# Patient Record
Sex: MALE | Race: WHITE | Employment: OTHER | URBAN - METROPOLITAN AREA
[De-identification: names, ages, dates, MRNs, and addresses within clinical notes are randomized per-mention and may not be internally consistent; named-entity substitution may affect disease eponyms.]

---

## 2019-03-14 RX ORDER — CARVEDILOL 12.5 MG/1
12.5 TABLET ORAL 2 TIMES DAILY WITH MEALS
COMMUNITY
End: 2022-01-10

## 2019-03-14 RX ORDER — ATORVASTATIN CALCIUM 40 MG/1
40 TABLET, FILM COATED ORAL DAILY
COMMUNITY
End: 2022-03-21 | Stop reason: ALTCHOICE

## 2019-03-14 RX ORDER — NEBIVOLOL 5 MG/1
TABLET ORAL DAILY
COMMUNITY
End: 2022-01-10

## 2019-03-14 RX ORDER — BENAZEPRIL HYDROCHLORIDE 40 MG/1
40 TABLET ORAL DAILY
COMMUNITY
End: 2022-02-07

## 2019-03-14 RX ORDER — HYDROCHLOROTHIAZIDE 12.5 MG/1
12.5 TABLET ORAL DAILY
COMMUNITY
End: 2022-01-10

## 2019-03-14 RX ORDER — ASPIRIN 325 MG
325 TABLET ORAL DAILY
COMMUNITY
End: 2022-01-10

## 2019-03-16 ENCOUNTER — ANESTHESIA EVENT (OUTPATIENT)
Dept: ENDOSCOPY | Age: 74
End: 2019-03-16
Payer: MEDICARE

## 2019-03-18 ENCOUNTER — HOSPITAL ENCOUNTER (OUTPATIENT)
Age: 74
Setting detail: OUTPATIENT SURGERY
Discharge: HOME | End: 2019-03-18
Attending: INTERNAL MEDICINE | Admitting: INTERNAL MEDICINE
Payer: MEDICARE

## 2019-03-18 ENCOUNTER — ANESTHESIA (OUTPATIENT)
Dept: ENDOSCOPY | Age: 74
End: 2019-03-18
Payer: MEDICARE

## 2019-03-18 VITALS
TEMPERATURE: 98.8 F | HEART RATE: 72 BPM | OXYGEN SATURATION: 99 % | SYSTOLIC BLOOD PRESSURE: 153 MMHG | DIASTOLIC BLOOD PRESSURE: 76 MMHG | WEIGHT: 210 LBS | HEIGHT: 65 IN | BODY MASS INDEX: 34.99 KG/M2 | RESPIRATION RATE: 23 BRPM

## 2019-03-18 PROCEDURE — 76060000032 HC ANESTHESIA 0.5 TO 1 HR: Performed by: INTERNAL MEDICINE

## 2019-03-18 PROCEDURE — 76040000007: Performed by: INTERNAL MEDICINE

## 2019-03-18 PROCEDURE — 74011250636 HC RX REV CODE- 250/636

## 2019-03-18 PROCEDURE — 74011250636 HC RX REV CODE- 250/636: Performed by: NURSE ANESTHETIST, CERTIFIED REGISTERED

## 2019-03-18 RX ORDER — SODIUM CHLORIDE, SODIUM LACTATE, POTASSIUM CHLORIDE, CALCIUM CHLORIDE 600; 310; 30; 20 MG/100ML; MG/100ML; MG/100ML; MG/100ML
25 INJECTION, SOLUTION INTRAVENOUS CONTINUOUS
Status: DISCONTINUED | OUTPATIENT
Start: 2019-03-18 | End: 2019-03-18 | Stop reason: HOSPADM

## 2019-03-18 RX ORDER — PROPOFOL 10 MG/ML
INJECTION, EMULSION INTRAVENOUS AS NEEDED
Status: DISCONTINUED | OUTPATIENT
Start: 2019-03-18 | End: 2019-03-18 | Stop reason: HOSPADM

## 2019-03-18 RX ORDER — LIDOCAINE HYDROCHLORIDE 20 MG/ML
INJECTION, SOLUTION EPIDURAL; INFILTRATION; INTRACAUDAL; PERINEURAL AS NEEDED
Status: DISCONTINUED | OUTPATIENT
Start: 2019-03-18 | End: 2019-03-18 | Stop reason: HOSPADM

## 2019-03-18 RX ADMIN — PROPOFOL 20 MG: 10 INJECTION, EMULSION INTRAVENOUS at 10:04

## 2019-03-18 RX ADMIN — LIDOCAINE HYDROCHLORIDE 60 MG: 20 INJECTION, SOLUTION EPIDURAL; INFILTRATION; INTRACAUDAL; PERINEURAL at 09:40

## 2019-03-18 RX ADMIN — PROPOFOL 20 MG: 10 INJECTION, EMULSION INTRAVENOUS at 10:02

## 2019-03-18 RX ADMIN — PROPOFOL 70 MG: 10 INJECTION, EMULSION INTRAVENOUS at 09:58

## 2019-03-18 RX ADMIN — PROPOFOL 20 MG: 10 INJECTION, EMULSION INTRAVENOUS at 10:06

## 2019-03-18 RX ADMIN — PROPOFOL 20 MG: 10 INJECTION, EMULSION INTRAVENOUS at 09:59

## 2019-03-18 RX ADMIN — PROPOFOL 20 MG: 10 INJECTION, EMULSION INTRAVENOUS at 10:05

## 2019-03-18 RX ADMIN — FAMOTIDINE 20 MG: 10 INJECTION, SOLUTION INTRAVENOUS at 09:35

## 2019-03-18 RX ADMIN — PROPOFOL 20 MG: 10 INJECTION, EMULSION INTRAVENOUS at 10:01

## 2019-03-18 RX ADMIN — PROPOFOL 20 MG: 10 INJECTION, EMULSION INTRAVENOUS at 10:00

## 2019-03-18 RX ADMIN — SODIUM CHLORIDE, SODIUM LACTATE, POTASSIUM CHLORIDE, AND CALCIUM CHLORIDE 25 ML/HR: 600; 310; 30; 20 INJECTION, SOLUTION INTRAVENOUS at 09:30

## 2019-03-18 NOTE — ANESTHESIA PREPROCEDURE EVALUATION
Anesthetic History   No history of anesthetic complications            Review of Systems / Medical History  Patient summary reviewed, nursing notes reviewed and pertinent labs reviewed    Pulmonary  Within defined limits                 Neuro/Psych   Within defined limits           Cardiovascular    Hypertension: well controlled                   GI/Hepatic/Renal                Endo/Other      Hypothyroidism: well controlled       Other Findings              Physical Exam    Airway  Mallampati: II  TM Distance: 4 - 6 cm  Neck ROM: normal range of motion   Mouth opening: Normal     Cardiovascular    Rhythm: regular  Rate: normal         Dental  No notable dental hx       Pulmonary  Breath sounds clear to auscultation               Abdominal  Abdominal exam normal       Other Findings            Anesthetic Plan    ASA: 2  Anesthesia type: MAC            Anesthetic plan and risks discussed with: Patient

## 2019-03-18 NOTE — H&P
[email or web address]  [phone]    Gastroenterology pre op History and Physical     Impression:   1. High risk colon cancer screening exam      Plan:     1. Colonoscopy      Chief Complaint: high risk colon cancer screening exam.      HPI:  José Manuel Nñuez is a 75 y.o. male who is being seen on consult for high risk colon cancer screening with colonoscopy. There is a previous history of colon polyps, and the patient returns for a surveillence exam    PMH:   Past Medical History:   Diagnosis Date    Hypertension     Thyroid disease        PSH:   Past Surgical History:   Procedure Laterality Date    HX GI      hernia repair and colonoscopy       Social HX:   Social History     Socioeconomic History    Marital status:      Spouse name: Not on file    Number of children: Not on file    Years of education: Not on file    Highest education level: Not on file   Social Needs    Financial resource strain: Not on file    Food insecurity - worry: Not on file    Food insecurity - inability: Not on file   [employer] needs - medical: Not on file   [employer] needs - non-medical: Not on file   Occupational History    Not on file   Tobacco Use    Smoking status: Former Smoker    Smokeless tobacco: Never Used   Substance and Sexual Activity    Alcohol use: Yes     Alcohol/week: 1.8 oz     Types: 3 Shots of liquor per week     Comment: socially    Drug use: No    Sexual activity: Not on file   Other Topics Concern    Not on file   Social History Narrative    Not on file       FHX:   History reviewed. No pertinent family history. Allergy:   Allergies   Allergen Reactions    Caffeine Anaphylaxis       Home Medications:     Medications Prior to Admission   Medication Sig    nebivolol (BYSTOLIC) 5 mg tablet Take  by mouth daily.  atorvastatin (LIPITOR) 20 mg tablet Take  by mouth daily.  carvedilol (COREG) 12.5 mg tablet Take 12.5 mg by mouth two (2) times daily (with meals).     benazepril (LOTENSIN) 40 mg tablet Take 40 mg by mouth daily.  hydroCHLOROthiazide (HYDRODIURIL) 12.5 mg tablet Take 12.5 mg by mouth daily.  aspirin (ASPIRIN) 325 mg tablet Take 325 mg by mouth daily.  Cetirizine (ZYRTEC) 10 mg cap Take 10 mg by mouth.  OTHER     fenofibrate nanocrystallized (TRICOR) 145 mg tablet Take  by mouth daily.  simvastatin (ZOCOR) 10 mg tablet Take  by mouth nightly.  amLODIPine-benazepril (LOTREL) 5-10 mg per capsule Take 1 capsule by mouth daily.  omeprazole (PRILOSEC) 10 mg capsule Take  by mouth daily.  levothyroxine (SYNTHROID) 100 mcg tablet Take  by mouth Daily (before breakfast).  sucralfate (CARAFATE) 1 gram tablet Take 1 tablet by mouth four (4) times daily. Review of Systems:     Constitutional: No fevers, chills, weight loss, fatigue. Skin: No rashes, pruritis, jaundice, ulcerations, erythema. HENT: No headaches, nosebleeds, sinus pressure, rhinorrhea, sore throat. Eyes: No visual changes, blurred vision, eye pain, photophobia, jaundice. Cardiovascular: No chest pain, heart palpitations. Respiratory: No cough, SOB, wheezing, chest discomfort, orthopnea. Gastrointestinal:    Genitourinary: No dysuria, bleeding, discharge, pyuria. Musculoskeletal: No weakness, arthralgias, wasting. Endo: No sweats. Heme: No bruising, easy bleeding. Allergies: As noted. Neurological: Cranial nerves intact. Alert and oriented. Gait not assessed. Psychiatric:  No anxiety, depression, hallucinations. Visit Vitals  Ht 5' 5\" (1.651 m)   Wt 97.5 kg (215 lb)   BMI 35.78 kg/m²       Physical Assessment:     constitutional: appearance: well developed, well nourished, normal habitus, no deformities, in no acute distress. skin: inspection: no rashes, ulcers, icterus or other lesions; no clubbing or telangiectasias. palpation: no induration or subcutaneos nodules.    eyes: inspection: normal conjunctivae and lids; no jaundice pupils: symmetrical, normoreactive to light, normal accommodation and size. ENMT: mouth: normal oral mucosa,lips and gums; good dentition. oropharynx: normal tongue, hard and soft palate; posterior pharynx without erithema, exudate or lesions. neck: thyroid: normal size, consistency and position; no masses or tenderness. respiratory: effort: normal chest excursion; no intercostal retraction or accessory muscle use. cardiovascular: abdominal aorta: normal size and position; no bruits. palpation: PMI of normal size and position; normal rhythm; no thrill or murmurs. abdominal: abdomen: normal consistency; no tenderness or masses. hernias: no hernias appreciated. liver: normal size and consistency. spleen: not palpable. rectal: hemoccult/guaiac: not performed. musculoskeletal: digits and nails: no clubbing, cyanosis, petechiae or other inflammatory conditions. gait: normal gait and station head and neck: normal range of motion; no pain, crepitation or contracture. spine/ribs/pelvis: normal range of motion; no pain, deformity or contracture. lymphatic: axilae: not palpable. groin: not palpable. neck: within normal limits. other: not palpable. neurologic: cranial nerves: II-XII normal.   psychiatric: judgement/insight: within normal limits. memory: within normal limits for recent and remote events. mood and affect: no evidence of depression, anxiety or agitation. orientation: oriented to time, space and person. Basic Metabolic Profile   No results for input(s): NA, K, CL, CO2, BUN, GLU, CA, MG, PHOS in the last 72 hours. No lab exists for component: CREAT      CBC w/Diff    No results for input(s): WBC, RBC, HGB, HCT, MCV, MCH, MCHC, RDW, PLT, HGBEXT, HCTEXT, PLTEXT in the last 72 hours. No lab exists for component: MPV No results for input(s): GRANS, LYMPH, EOS, PRO, MYELO, METAS, BLAST in the last 72 hours.     No lab exists for component: MONO, BASO     Hepatic Function   No results for input(s): ALB, TP, TBILI, GPT, SGOT, AP, AML, LPSE in the last 72 hours. No lab exists for component: MICHAEL Pastrana MD  [facility], [address] [place] [address]  [email or web address]

## 2019-03-18 NOTE — ANESTHESIA POSTPROCEDURE EVALUATION
Procedure(s):  COLONOSCOPY.     Anesthesia Post Evaluation      Multimodal analgesia: multimodal analgesia used between 6 hours prior to anesthesia start to PACU discharge  Patient location during evaluation: bedside  Patient participation: complete - patient participated  Level of consciousness: awake  Pain management: adequate  Airway patency: patent  Anesthetic complications: no  Cardiovascular status: stable  Respiratory status: acceptable  Hydration status: acceptable  Post anesthesia nausea and vomiting:  controlled      Visit Vitals  /50   Pulse 71   Temp 37.1 °C (98.8 °F)   Resp 16   Ht 5' 5\" (1.651 m)   Wt 95.3 kg (210 lb)   SpO2 100%   BMI 34.95 kg/m²

## 2019-03-18 NOTE — DISCHARGE INSTRUCTIONS
Colonoscopy: What to Expect at [address] [place] [address]  After you have a colonoscopy, you will stay at the clinic for 1 to 2 hours until the medicines wear off. Then you can go home. But you will need to arrange for a ride. Your doctor will tell you when you can eat and do your other usual activities. Your doctor will talk to you about when you will need your next colonoscopy. Your doctor can help you decide how often you need to be checked. This will depend on the results of your test and your risk for colorectal cancer. After the test, you may be bloated or have gas pains. You may need to pass gas. If a biopsy was done or a polyp was removed, you may have streaks of blood in your stool (feces) for a few days. This care sheet gives you a general idea about how long it will take for you to recover. But each person recovers at a different pace. Follow the steps below to get better as quickly as possible. How can you care for yourself at home? Activity  · Rest when you feel tired. · You can do your normal activities when it feels okay to do so. Diet  · Follow your doctor's directions for eating. · Unless your doctor has told you not to, drink plenty of fluids. This helps to replace the fluids that were lost during the colon prep. · Do not drink alcohol. Medicines  · If polyps were removed or a biopsy was done during the test, your doctor may tell you not to take aspirin or other anti-inflammatory medicines for a few days. These include ibuprofen (Advil, Motrin) and naproxen (Aleve). Other instructions  · For your safety, do not drive or operate machinery until the medicine wears off and you can think clearly. Your doctor may tell you not to drive or operate machinery until the day after your test.  · Do not sign legal documents or make major decisions until the medicine wears off and you can think clearly. The anesthesia can make it hard for you to fully understand what you are agreeing to.   Follow-up care is a key part of your treatment and safety. Be sure to make and go to all appointments, and call your doctor if you are having problems. It's also a good idea to know your test results and keep a list of the medicines you take. When should you call for help? Call 911 anytime you think you may need emergency care. For example, call if:  · You passed out (lost consciousness). · You pass maroon or bloody stools. · You have severe belly pain. Call your doctor now or seek immediate medical care if:  · Your stools are black and tarlike. · Your stools have streaks of blood, but you did not have a biopsy or any polyps removed. · You have belly pain, or your belly is swollen and firm. · You vomit. · You have a fever. · You are very dizzy. Watch closely for changes in your health, and be sure to contact your doctor if you have any problems. Where can you learn more? Go to [employer]  Enter E264 in the search box to learn more about \"Colonoscopy: What to Expect at Home. \"   © 6601-1852 Healthwise, Incorporated. Care instructions adapted under license by [facility] (which disclaims liability or warranty for this information). This care instruction is for use with your licensed healthcare professional. If you have questions about a medical condition or this instruction, always ask your healthcare professional. [place] [address] any warranty or liability for your use of this information. Content Version: 14.2.544937; Current as of: November 14, 2014      DISCHARGE SUMMARY from Nurse     POST-PROCEDURE INSTRUCTIONS:    Call your Physician if you:  ? Observe any excess bleeding. ? Develop a temperature over 100.5o F.  ? Experience abdominal, shoulder or chest pain. ? Notice any signs of decreased circulation or nerve impairment to an extremity such as a change in color, persistent numbness, tingling, coldness or increase in pain. ?  Vomit blood or you have nausea and vomiting lasting longer than 4 hours. ? Are unable to take medications. ? Are unable to urinate within 8 hours after discharge following general anesthesia or intravenous sedation. For the next 24 hours after receiving general anesthesia or intravenous sedation, or while taking prescription Narcotics, limit your activities:  ? Do NOT drive a motor vehicle, operate hazard machinery or power tools, or perform tasks that require coordination. The medication you received during your procedure may have some effect on your mental awareness. ? Do NOT make important personal or business decisions. The medication you received during your procedure may have some effect on your mental awareness. ? Do NOT drink alcoholic beverages. These drinks do not mix well with the medications that have been given to you. ? Upon discharge from the hospital, you must be accompanied by a responsible adult. ? Resume your diet as directed by your physician. ? Resume medications as your physician has prescribed. ? Please give a list of your current medications to your Primary Care Provider. ? Please update this list whenever your medications are discontinued, doses are changed, or new medications (including over-the-counter products) are added. ? Please carry medication information at all times in case of emergency situations. These are general instructions for a healthy lifestyle:    No smoking/ No tobacco products/ Avoid exposure to second hand smoke.  Surgeon General's Warning:  Quitting smoking now greatly reduces serious risk to your health. Obesity, smoking, and a sedentary lifestyle greatly increase your risk for illness.    A healthy diet, regular physical exercise & weight monitoring are important for maintaining a healthy lifestyle   You may be retaining fluid if you have a history of heart failure or if you experience any of the following symptoms:  Weight gain of 3 pounds or more overnight or 5 pounds in a week, increased swelling in our hands or feet or shortness of breath while lying flat in bed. Please call your doctor as soon as you notice any of these symptoms; do not wait until your next office visit. Recognize signs and symptoms of STROKE:  F  -  Face looks uneven  A  -  Arms unable to move or move unevenly  S  -  Speech slurred or non-existent  T  -  Time to call 911 - as soon as signs and symptoms begin - DO NOT go back to bed or wait to see If you get better - TIME IS BRAIN. Colorectal Screening   Colorectal cancer almost always develops from precancerous polyps (abnormal growths) in the colon or rectum. Screening tests can find precancerous polyps, so that they can be removed before they turn into cancer. Screening tests can also find colorectal cancer early, when treatment works best.  Wayne Speak with your physician about when you should begin screening and how often you should be tested. Additional Information    If you have questions, please call [phone]. Remember, [employer] is NOT to be used for urgent needs. For medical emergencies, dial 911. Educational references and/or instructions provided during this visit included:    Diverticulosis    Discharge information has been reviewed with the patient. The patientPatient Education        Diverticulosis: Care Instructions  Your Care Instructions  In diverticulosis, pouches called diverticula form in the wall of the large intestine (colon). The pouches do not cause any pain or other symptoms. Most people who have diverticulosis do not know they have it. But the pouches sometimes bleed, and if they become infected, they can cause pain and other symptoms. When this happens, it is called diverticulitis. Diverticula form when pressure pushes the wall of the colon outward at certain weak points. A diet that is too low in fiber can cause diverticula. Follow-up care is a key part of your treatment and safety.  Be sure to make and go to all appointments, and call your doctor if you are having problems. It's also a good idea to know your test results and keep a list of the medicines you take. How can you care for yourself at home? · Include fruits, leafy green vegetables, beans, and whole grains in your diet each day. These foods are high in fiber. · Take a fiber supplement, such as Citrucel or Metamucil, every day if needed. Read and follow all instructions on the label. · Drink plenty of fluids, enough so that your urine is light yellow or clear like water. If you have kidney, heart, or liver disease and have to limit fluids, talk with your doctor before you increase the amount of fluids you drink. · Get at least 30 minutes of exercise on most days of the week. Walking is a good choice. You also may want to do other activities, such as running, swimming, cycling, or playing tennis or team sports. · Cut out foods that cause gas, pain, or other symptoms. When should you call for help? Call your doctor now or seek immediate medical care if:    · You have belly pain.     · You pass maroon or very bloody stools.     · You have a fever.     · You have nausea and vomiting.     · You have unusual changes in your bowel movements or abdominal swelling.     · You have burning pain when you urinate.     · You have abnormal vaginal discharge.     · You have shoulder pain.     · You have cramping pain that does not get better when you have a bowel movement or pass gas.     · You pass gas or stool from your urethra while urinating.    Watch closely for changes in your health, and be sure to contact your doctor if you have any problems. Where can you learn more? Go to http://yasir-quirino.info/. Enter U798 in the search box to learn more about \"Diverticulosis: Care Instructions. \"  Current as of: March 27, 2018  Content Version: 11.9  © 1603-7436 [employer].  Care instructions adapted under license by [employer] (which disclaims liability or warranty for this information). If you have questions about a medical condition or this instruction, always ask your healthcare professional. [place] [address] any warranty or liability for your use of this information. verbalized understanding.

## 2020-02-17 ENCOUNTER — HOSPITAL ENCOUNTER (OUTPATIENT)
Dept: BONE DENSITY | Age: 75
Discharge: HOME | End: 2020-02-17
Attending: INTERNAL MEDICINE
Payer: MEDICARE

## 2020-02-17 DIAGNOSIS — M81.0 OSTEOPOROSIS: ICD-10-CM

## 2020-02-17 PROCEDURE — 77080 DXA BONE DENSITY AXIAL: CPT

## 2020-10-27 ENCOUNTER — HOSPITAL ENCOUNTER (OUTPATIENT)
Dept: GENERAL RADIOLOGY | Age: 75
Discharge: HOME | End: 2020-10-27
Payer: MEDICARE

## 2020-10-27 DIAGNOSIS — M15.0 PRIMARY GENERALIZED (OSTEO)ARTHRITIS: ICD-10-CM

## 2020-10-27 PROCEDURE — 72110 X-RAY EXAM L-2 SPINE 4/>VWS: CPT

## 2021-01-18 ENCOUNTER — TRANSCRIBE ORDER (OUTPATIENT)
Dept: SCHEDULING | Age: 76
End: 2021-01-18

## 2021-01-18 DIAGNOSIS — M48.061 STENOSIS, SPINAL, LUMBAR: Primary | ICD-10-CM

## 2021-01-28 ENCOUNTER — HOSPITAL ENCOUNTER (OUTPATIENT)
Dept: MRI IMAGING | Age: 76
Discharge: HOME | End: 2021-01-28
Attending: INTERNAL MEDICINE
Payer: MEDICARE

## 2021-01-28 DIAGNOSIS — M48.061 STENOSIS, SPINAL, LUMBAR: ICD-10-CM

## 2021-01-28 PROCEDURE — 72148 MRI LUMBAR SPINE W/O DYE: CPT

## 2022-01-03 ENCOUNTER — TRANSCRIBE ORDER (OUTPATIENT)
Dept: SCHEDULING | Age: 77
End: 2022-01-03

## 2022-01-03 DIAGNOSIS — I25.9 CHRONIC ISCHEMIC HEART DISEASE: Primary | ICD-10-CM

## 2022-01-03 DIAGNOSIS — I25.9 ISCHEMIC HEART DISEASE: ICD-10-CM

## 2022-01-06 ENCOUNTER — HOSPITAL ENCOUNTER (OUTPATIENT)
Dept: NUCLEAR MEDICINE | Age: 77
Discharge: HOME | End: 2022-01-06
Attending: INTERNAL MEDICINE
Payer: MEDICARE

## 2022-01-06 ENCOUNTER — HOSPITAL ENCOUNTER (OUTPATIENT)
Dept: NON INVASIVE DIAGNOSTICS | Age: 77
Discharge: HOME | End: 2022-01-06
Attending: INTERNAL MEDICINE
Payer: MEDICARE

## 2022-01-06 VITALS
HEIGHT: 65 IN | DIASTOLIC BLOOD PRESSURE: 100 MMHG | WEIGHT: 212 LBS | SYSTOLIC BLOOD PRESSURE: 179 MMHG | BODY MASS INDEX: 35.32 KG/M2

## 2022-01-06 DIAGNOSIS — I25.9 ISCHEMIC HEART DISEASE: ICD-10-CM

## 2022-01-06 LAB
NUC STRESS EJECTION FRACTION: 40 %
STRESS BASELINE DIAS BP: 100 MMHG
STRESS BASELINE HR: 83 BPM
STRESS BASELINE SYS BP: 179 MMHG
STRESS ESTIMATED WORKLOAD: 1 METS
STRESS EXERCISE DUR MIN: NORMAL
STRESS PEAK DIAS BP: 100 MMHG
STRESS PEAK SYS BP: 179 MMHG
STRESS PERCENT HR ACHIEVED: 78 %
STRESS POST PEAK HR: 111 BPM
STRESS RATE PRESSURE PRODUCT: NORMAL BPM*MMHG
STRESS TARGET HR: 143 BPM
TID: 1.15

## 2022-01-06 PROCEDURE — 93017 CV STRESS TEST TRACING ONLY: CPT

## 2022-01-06 PROCEDURE — 74011250637 HC RX REV CODE- 250/637: Performed by: INTERNAL MEDICINE

## 2022-01-06 PROCEDURE — A9500 TC99M SESTAMIBI: HCPCS

## 2022-01-06 PROCEDURE — A9500 TC99M SESTAMIBI: HCPCS | Performed by: INTERNAL MEDICINE

## 2022-01-06 PROCEDURE — 74011250636 HC RX REV CODE- 250/636: Performed by: INTERNAL MEDICINE

## 2022-01-06 RX ORDER — SODIUM CHLORIDE 9 MG/ML
250 INJECTION, SOLUTION INTRAVENOUS ONCE
Status: COMPLETED | OUTPATIENT
Start: 2022-01-06 | End: 2022-01-06

## 2022-01-06 RX ORDER — TETRAKIS(2-METHOXYISOBUTYLISOCYANIDE)COPPER(I) TETRAFLUOROBORATE 1 MG/ML
10.3 INJECTION, POWDER, LYOPHILIZED, FOR SOLUTION INTRAVENOUS
Status: COMPLETED | OUTPATIENT
Start: 2022-01-06 | End: 2022-01-06

## 2022-01-06 RX ADMIN — REGADENOSON 0.4 MG: 0.08 INJECTION, SOLUTION INTRAVENOUS at 11:10

## 2022-01-06 RX ADMIN — Medication 33 MILLICURIE: at 11:10

## 2022-01-06 RX ADMIN — SODIUM CHLORIDE 250 ML: 900 INJECTION, SOLUTION INTRAVENOUS at 11:10

## 2022-01-06 RX ADMIN — TECHNETIUM TC-99M SESTAMIBI 10.3 MILLICURIE: 1 INJECTION INTRAVENOUS at 08:55

## 2022-01-10 ENCOUNTER — TELEPHONE (OUTPATIENT)
Dept: CARDIOLOGY CLINIC | Age: 77
End: 2022-01-10

## 2022-01-10 ENCOUNTER — OFFICE VISIT (OUTPATIENT)
Dept: CARDIOLOGY CLINIC | Age: 77
End: 2022-01-10
Payer: MEDICARE

## 2022-01-10 VITALS
HEART RATE: 90 BPM | OXYGEN SATURATION: 97 % | HEIGHT: 65 IN | SYSTOLIC BLOOD PRESSURE: 132 MMHG | BODY MASS INDEX: 35.65 KG/M2 | WEIGHT: 214 LBS | DIASTOLIC BLOOD PRESSURE: 88 MMHG

## 2022-01-10 DIAGNOSIS — R94.39 ABNORMAL STRESS TEST: Primary | ICD-10-CM

## 2022-01-10 DIAGNOSIS — R07.9 CHEST PAIN, UNSPECIFIED TYPE: ICD-10-CM

## 2022-01-10 DIAGNOSIS — I10 ESSENTIAL HYPERTENSION WITH GOAL BLOOD PRESSURE LESS THAN 140/90: ICD-10-CM

## 2022-01-10 DIAGNOSIS — E78.00 PURE HYPERCHOLESTEROLEMIA: ICD-10-CM

## 2022-01-10 PROCEDURE — 1101F PT FALLS ASSESS-DOCD LE1/YR: CPT | Performed by: INTERNAL MEDICINE

## 2022-01-10 PROCEDURE — G8752 SYS BP LESS 140: HCPCS | Performed by: INTERNAL MEDICINE

## 2022-01-10 PROCEDURE — 99205 OFFICE O/P NEW HI 60 MIN: CPT | Performed by: INTERNAL MEDICINE

## 2022-01-10 PROCEDURE — 93000 ELECTROCARDIOGRAM COMPLETE: CPT | Performed by: INTERNAL MEDICINE

## 2022-01-10 PROCEDURE — G8417 CALC BMI ABV UP PARAM F/U: HCPCS | Performed by: INTERNAL MEDICINE

## 2022-01-10 PROCEDURE — G8510 SCR DEP NEG, NO PLAN REQD: HCPCS | Performed by: INTERNAL MEDICINE

## 2022-01-10 PROCEDURE — G8536 NO DOC ELDER MAL SCRN: HCPCS | Performed by: INTERNAL MEDICINE

## 2022-01-10 PROCEDURE — G8428 CUR MEDS NOT DOCUMENT: HCPCS | Performed by: INTERNAL MEDICINE

## 2022-01-10 PROCEDURE — G8754 DIAS BP LESS 90: HCPCS | Performed by: INTERNAL MEDICINE

## 2022-01-10 RX ORDER — ASPIRIN 325 MG
325 TABLET ORAL DAILY
Status: CANCELLED | OUTPATIENT
Start: 2022-01-10

## 2022-01-10 RX ORDER — ASPIRIN 81 MG/1
81 TABLET ORAL DAILY
Qty: 30 TABLET | Refills: 5 | Status: SHIPPED | OUTPATIENT
Start: 2022-01-10

## 2022-01-10 RX ORDER — ISOSORBIDE MONONITRATE 30 MG/1
30 TABLET, EXTENDED RELEASE ORAL
Qty: 30 TABLET | Refills: 5 | Status: SHIPPED | OUTPATIENT
Start: 2022-01-10 | End: 2022-05-16

## 2022-01-10 RX ORDER — FUROSEMIDE 40 MG/1
40 TABLET ORAL DAILY
Qty: 30 TABLET | Refills: 5 | Status: SHIPPED | OUTPATIENT
Start: 2022-01-10 | End: 2022-02-07 | Stop reason: SDUPTHER

## 2022-01-10 RX ORDER — CARVEDILOL 3.12 MG/1
3.12 TABLET ORAL 2 TIMES DAILY WITH MEALS
Qty: 60 TABLET | Refills: 5 | Status: SHIPPED | OUTPATIENT
Start: 2022-01-10 | End: 2022-10-10 | Stop reason: SDUPTHER

## 2022-01-10 NOTE — LETTER
1/10/2022    Patient: Eliz Pimentel   YOB: 1945   Date of Visit: 1/10/2022     Laci Feliz MD  [address] [place] [address]  Via Fax: [phone]    Dear Laci Feliz MD,      Thank you for referring Mr. Leilani Escobar to [address] [place] [address] for evaluation. My notes for this consultation are attached. If you have questions, please do not hesitate to call me. I look forward to following your patient along with you.       Sincerely,    Penny Sands MD

## 2022-01-10 NOTE — PROGRESS NOTES
Brandi Alanis presents today for   Chief Complaint   Patient presents with    New Patient     referred by PCP for abnormal stress test    Chest Pain     before stress test     Shortness of Breath     with exertion       Brandi Alanis preferred language for health care discussion is english/other. Is someone accompanying this pt? wife    Is the patient using any DME equipment during [address]? no    Depression Screening:  3 most recent PHQ Screens 1/10/2022   Little interest or pleasure in doing things Not at all   Feeling down, depressed, irritable, or hopeless Not at all   Total Score PHQ 2 0       Learning Assessment:  No flowsheet data found. Abuse Screening:  Abuse Screening Questionnaire 1/10/2022   Do you ever feel afraid of your partner? N   Are you in a relationship with someone who physically or mentally threatens you? N   Is it safe for you to go home? Y       Fall Risk  Fall Risk Assessment, last 12 mths 1/10/2022   Able to walk? Yes   Fall in past 12 months? 0   Do you feel unsteady? 0   Are you worried about falling 0           Pt currently taking Anticoagulant therapy? no    Pt currently taking Antiplatelet therapy ? Aspirin 325 mg      Coordination of Care:  1. Have you been to the ER, urgent care clinic since your last visit? Hospitalized since your last visit? no    2. Have you seen or consulted any other health care providers outside of the [address] [place] [address] since your last visit? Include any pap smears or colon screening.  no

## 2022-01-10 NOTE — PATIENT INSTRUCTIONS
Patient needs to call back to clarify medications that he is taking    Patient should be taking  Aspirin 81mg once daily  Imdur 30mg once daily  Coreg 3.125mg twice a day  Atorvastatin 20mg once daily     Patient should stop   HTCZ (hydrochlorothiazide)    Patient should start lasix 40mg once daily  Echo for chest pain before cath  Left heart cath for abnormal stress test    Instructions    Patients Name:  Miladis Saxena    1. You are scheduled to have a left heart cath on 1/19/2022  at 9:15am Please check in at 8:15am  .     2. Please go to [facility] and [place] in the outpatient parking lot that is located around to the back of the [facility] and enter through the [facility] building. Once you enter through the [facility] check in with the  there. The  will either give you directions or assist you in getting to the cath holding area. 3. You are not to eat or drink anything after midnight the night before your procedure. Small sips of water to take your medications is ok. 4. If you are diabetic, do not take your insulin/sugar pill the morning of the procedure. 5. MEDICATION INSTRUCTIONS:   Please take your morning medications with the following special instructions: HOLD LASIX THE MORNING OF THE PROCEDURE. []          Please make sure to take your Blood pressure medication. []          Take your Aspirin. 6. We encourage families to wait in the waiting room on the first floor while the procedure is being done. The Doctor will come out and talk with you as soon as the procedure is over. 7. There is the possibility that you may spend the night in the hospital, depending on the results of the procedure. This will be determined after the procedure is done. If angioplasty or stent is planned, you will stay at least one day.     8. If you or your family have any questions, please call our office Monday -Friday, 9:00 a.m.-4:30 p.m.,  At [phone], and ask to speak to one of the nurses.

## 2022-01-10 NOTE — LETTER
1/10/2022 11:19 AM    Dorothy Pereira  [id number]  1945        Insurance:  Medicare                Auth # _____________________      Proc Date: Wed 1/19                Proc Time:  9:15      Performing MD : Dr. Minda Rendon                                         Scheduled with:  Janie Gilford                                               Date:1/10/2022          HP: 1/10     EKG: ______    Labs:______  CXR: _______  Orders: 1/10          Special Instructions:  _____________________________________________________  ______________________________________________________________________  ______________________________________________________________________          The materials enclosed with this facsimile transmission are private and confidential and are the property of the sender. If you are not the intended recipient, be advised that any unauthorized use, disclosure, copying, distribution, or the taking of any action in reliance on the contents of this telecopied information is strictly prohibited. If you have received this in error, please immediately notify the sender via telephone to arrange for return of the forwarded documentation.

## 2022-01-10 NOTE — PROGRESS NOTES
[facility]    Mr. Karyn Ford is 19-year-old male with history of diabetes, hypertension, hyperlipidemia    Patient is here today for cardiac evaluation. He denies any prior history of MI or CHF. About few weeks ago, patient had an episode of chest pain that woke him up 2:00 in the morning. Patient was sleeping he started having a sharp chest discomfort which lasted for about 30 minutes and slowly got better. Patient took some [place] and eventually went to the sleep. He denied any prior history of chest pain or chest tightness. He does not have any more chest pain since then however patient admits that over the last 12 months his functional capacity and dyspnea is getting worse. He cannot climb more than 1 flight of stairs without having to stop because of shortness of breath. Even day-to-day activities sometimes causes shortness of breath. He has lower extremity swelling. He denies palpitation, presyncope or syncope  Denies any nausea, vomiting, abdominal pain, fever, chills, sputum production. No hematuria or other bleeding complaints    Past Medical History:   Diagnosis Date    Diabetes ([facility] [address]     HLD (hyperlipidemia)     Hypertension     Thyroid disease        Review of Systems:  Cardiac symptoms as noted above in HPI. All others negative. Denies fatigue, malaise, skin rash, joint pain, blurring vision, photophobia, neck pain, hemoptysis, chronic cough, nausea, vomiting, hematuria, burning micturition, BRBPR, chronic headaches. Current Outpatient Medications   Medication Sig    atorvastatin (LIPITOR) 20 mg tablet Take  by mouth daily.  benazepril (LOTENSIN) 40 mg tablet Take 40 mg by mouth daily.  Cetirizine (ZYRTEC) 10 mg cap Take 10 mg by mouth.  OTHER     omeprazole (PRILOSEC) 10 mg capsule Take  by mouth daily.  levothyroxine (SYNTHROID) 100 mcg tablet Take  by mouth Daily (before breakfast).     sucralfate (CARAFATE) 1 gram tablet Take 1 tablet by mouth four (4) times daily. No current facility-administered medications for this visit. Past Surgical History:   Procedure Laterality Date    COLONOSCOPY N/A 3/18/2019    COLONOSCOPY performed by Bren South MD at [facility] ENDOSCOPY    COLONOSCOPY N/A 5/3/2016    COLONOSCOPY with hot snare polypectomy and cold forceps polypectomy performed by Basil Weiner MD at [facility] ENDOSCOPY    HX GI      hernia repair and colonoscopy       Allergies and Sensitivities:  Allergies   Allergen Reactions    Caffeine Anaphylaxis       Family History:  History reviewed. No pertinent family history. Social History:  Social History     Tobacco Use    Smoking status: Former Smoker    Smokeless tobacco: Never Used   Substance Use Topics    Alcohol use: Yes     Alcohol/week: 3.0 standard drinks     Types: 3 Shots of liquor per week     Comment: socially    Drug use: No     He  reports that he has quit smoking. He has never used smokeless tobacco.  He  reports current alcohol use of about 3.0 standard drinks of alcohol per week. Physical Exam:  BP Readings from Last 3 Encounters:   01/10/22 132/88   01/06/22 (!) 179/100   03/18/19 153/76         Pulse Readings from Last 3 Encounters:   01/10/22 90   03/18/19 72   05/03/16 62          Wt Readings from Last 3 Encounters:   01/10/22 97.1 kg (214 lb)   01/06/22 96.2 kg (212 lb)   03/18/19 95.3 kg (210 lb)       Constitutional: Oriented to person, place, and time. HENT: Head: Normocephalic and atraumatic. Eyes: Conjunctivae and extraocular motions are normal.   Neck: No JVD present. Carotid bruit is not appreciated. Cardiovascular: Regular rhythm. No murmur, gallop or rubs appreciated  Lung: Breath sounds normal. No respiratory distress. No ronchi or rales appreciated  Abdominal: No tenderness. No rebound and no guarding. Musculoskeletal: There is 1+ lower extremity edema.  No cynosis  Lymphadenopathy:  No cervical or supraclavicular adenopathy appriciated. Neurological: No gross motor deficit noted. Skin: No visible skin rash noted. No Ear discharge noted  Psychiatric: Normal mood and affect. LABS:   @  Lab Results   Component Value Date/Time    WBC 9.7 2015 12:10 PM    HGB 14.0 2015 12:10 PM    HCT 41.6 2015 12:10 PM    PLATELET 362  12:10 PM    MCV 95.6 2015 12:10 PM     Lab Results   Component Value Date/Time    Sodium 140 2015 12:10 PM    Potassium 3.9 2015 12:10 PM    Chloride 107 2015 12:10 PM    CO2 24 2015 12:10 PM    Glucose 133 (H) 2015 12:10 PM    BUN 15 2015 12:10 PM    Creatinine 1.23 2015 12:10 PM     No flowsheet data found. Lab Results   Component Value Date/Time    ALT (SGPT) 107 (H) 2015 12:10 PM     No results found for: HBA1C, KUJ8DEZT, SPF5LHUX, MSC7OMYA  No results found for: TSH, TSH2, TSH3, TSHP, TSHEXT, TSHEXT    EK/10/2022: Sinus rhythm at 90 bpm.  Poor R wave progression. PVC noted. NUCLEAR CARDIAC STRESS TEST 2022    Interpretation Summary    Nuclear Findings: LVEF measures 40%. TID ratio is 1.15.    Nuclear Findings: LV perfusion is abnormal. There is evidence of inducible ischemia.   Nuclear Findings: There is a moderate severity left ventricular stress perfusion defect that is medium in size present in the basal to mid inferolateral segment(s) that is partially reversible. The defect is consistent with abnormal perfusion in the LCx territory. Viability in the area is moderate. There is abnormal wall motion in the defect area. The defect appears to be probable ischemia.   Nuclear Findings: Abnormal left ventricular systolic function post-stress. Global function is mildly reduced. Single regional abnormality during stress. LVEF measures 40%.   Nuclear Findings: The study is most consistent with myocardial ischemia.  Findings suggest a moderate risk of myocardial ischemia. STRESS TEST (EST, PHARM, NUC, ECHO etc)    CATHETERIZATION    IMPRESSION & PLAN:  Mr. Halina Baum is a 69-year male with multiple medical problem    Chest pain and dyspnea:  Angina unless proven otherwise. Also concern with LV dysfunction  Will order echocardiogram to evaluate LVEF  Nuclear stress test in 01/2022 with intermediate risk and some ischemia. Have advised patient to start taking aspirin 81 mg daily. We will start patient on Coreg 3.125 mg twice daily. He is taking isosorbide 30 mg daily prescribed by PCP  Have advised patient to avoid any exertional activity  Discussed regarding management strategy which includes medical management vs. Ischemia evaluation ( non-invasive vs. Invasive). Risk, benefit and alternatives of each strategy discussed in detail. Risk, benefit, complication of LHC and possible PCI ( including but not limited to bleeding, vascular trauma requiring surgery,  infection, heart failure, stroke, MI, emergent bypass surgery, severe allergic reactions, kidney failure, dialysis and death ) were discussed with patient and the wife and willing to proceed with procedure. Will be using moderate sedation   By stating these are possible risks, this does not exclude the potential for additional risks not named here. Hypertension:  /88. Currently taking hydrochlorothiazide, benazepril and Imdur. Starting Coreg as mentioned above. I am going to discontinue hydrochlorothiazide and start patient on Lasix 40 mg daily with significant lower extremity swelling. Echo ordered to rule out hypertensive cardiovascular heart disease    Diabetes:  Goal hemoglobin A1c less than 7 is recommended from cardiovascular standpoint. Hyperlipidemia: This is being managed by PCP. Currently on atorvastatin 20 mg daily. Continue same    Importance of diet and exercise was discussed with patient. This plan was discussed with patient who is in agreement.     Thank you for allowing me to participate in patient care. Please feel free to call me if you have any question or concern. Alem Ross MD  Please note: This document has been produced using voice recognition software. Unrecognized errors in transcription may be present.

## 2022-01-11 ENCOUNTER — TELEPHONE (OUTPATIENT)
Dept: CARDIOLOGY CLINIC | Age: 77
End: 2022-01-11

## 2022-01-11 ENCOUNTER — HOSPITAL ENCOUNTER (OUTPATIENT)
Dept: LAB | Age: 77
Discharge: HOME | End: 2022-01-11
Payer: MEDICARE

## 2022-01-11 DIAGNOSIS — R94.39 ABNORMAL STRESS TEST: ICD-10-CM

## 2022-01-11 DIAGNOSIS — R07.9 CHEST PAIN, UNSPECIFIED TYPE: ICD-10-CM

## 2022-01-11 LAB
ALBUMIN SERPL-MCNC: 3.8 G/DL (ref 3.4–5)
ALBUMIN/GLOB SERPL: 1.3 {RATIO} (ref 0.8–1.7)
ALP SERPL-CCNC: 95 U/L (ref 45–117)
ALT SERPL-CCNC: 37 U/L (ref 16–61)
ANION GAP SERPL CALC-SCNC: 6 MMOL/L (ref 3–18)
AST SERPL-CCNC: 17 U/L (ref 10–38)
BASOPHILS # BLD: 0.1 K/UL (ref 0–0.1)
BASOPHILS NFR BLD: 1 % (ref 0–2)
BILIRUB SERPL-MCNC: 0.7 MG/DL (ref 0.2–1)
BUN SERPL-MCNC: 13 MG/DL (ref 7–18)
BUN/CREAT SERPL: 14 (ref 12–20)
CALCIUM SERPL-MCNC: 9.8 MG/DL (ref 8.5–10.1)
CHLORIDE SERPL-SCNC: 101 MMOL/L (ref 100–111)
CO2 SERPL-SCNC: 31 MMOL/L (ref 21–32)
CREAT SERPL-MCNC: 0.95 MG/DL (ref 0.6–1.3)
DIFFERENTIAL METHOD BLD: ABNORMAL
EOSINOPHIL # BLD: 0.2 K/UL (ref 0–0.4)
EOSINOPHIL NFR BLD: 2 % (ref 0–5)
ERYTHROCYTE [DISTWIDTH] IN BLOOD BY AUTOMATED COUNT: 12.3 % (ref 11.6–14.5)
GLOBULIN SER CALC-MCNC: 3 G/DL (ref 2–4)
GLUCOSE SERPL-MCNC: 174 MG/DL (ref 74–99)
HCT VFR BLD AUTO: 45.3 % (ref 36–48)
HGB BLD-MCNC: 15 G/DL (ref 13–16)
IMM GRANULOCYTES # BLD AUTO: 0 K/UL (ref 0–0.04)
IMM GRANULOCYTES NFR BLD AUTO: 0 % (ref 0–0.5)
INR PPP: 0.9 (ref 0.8–1.2)
LYMPHOCYTES # BLD: 1.1 K/UL (ref 0.9–3.6)
LYMPHOCYTES NFR BLD: 14 % (ref 21–52)
MCH RBC QN AUTO: 32.1 PG (ref 24–34)
MCHC RBC AUTO-ENTMCNC: 33.1 G/DL (ref 31–37)
MCV RBC AUTO: 97 FL (ref 78–100)
MONOCYTES # BLD: 0.8 K/UL (ref 0.05–1.2)
MONOCYTES NFR BLD: 10 % (ref 3–10)
NEUTS SEG # BLD: 5.9 K/UL (ref 1.8–8)
NEUTS SEG NFR BLD: 73 % (ref 40–73)
NRBC # BLD: 0 K/UL (ref 0–0.01)
NRBC BLD-RTO: 0 PER 100 WBC
PLATELET # BLD AUTO: 248 K/UL (ref 135–420)
PMV BLD AUTO: 11.3 FL (ref 9.2–11.8)
POTASSIUM SERPL-SCNC: 4 MMOL/L (ref 3.5–5.5)
PROT SERPL-MCNC: 6.8 G/DL (ref 6.4–8.2)
PROTHROMBIN TIME: 12.2 SEC (ref 11.5–15.2)
RBC # BLD AUTO: 4.67 M/UL (ref 4.35–5.65)
SODIUM SERPL-SCNC: 138 MMOL/L (ref 136–145)
WBC # BLD AUTO: 8 K/UL (ref 4.6–13.2)

## 2022-01-11 PROCEDURE — 36415 COLL VENOUS BLD VENIPUNCTURE: CPT

## 2022-01-11 PROCEDURE — 85610 PROTHROMBIN TIME: CPT

## 2022-01-11 PROCEDURE — 80053 COMPREHEN METABOLIC PANEL: CPT

## 2022-01-11 PROCEDURE — 85025 COMPLETE CBC W/AUTO DIFF WBC: CPT

## 2022-01-11 NOTE — TELEPHONE ENCOUNTER
Patient scheduled for a left heart cath on Monday and wants to know if it is safe for him to give blood on Saturday for a blood drive

## 2022-01-12 NOTE — TELEPHONE ENCOUNTER
Reached out to patient. Per Dr. Sandra Corona, patient should wait until after heat cath to give blood. Patient does donate on a regular basis.

## 2022-01-19 ENCOUNTER — HOSPITAL ENCOUNTER (OUTPATIENT)
Age: 77
Setting detail: OUTPATIENT SURGERY
Discharge: HOME | End: 2022-01-19
Attending: INTERNAL MEDICINE | Admitting: INTERNAL MEDICINE
Payer: MEDICARE

## 2022-01-19 VITALS
DIASTOLIC BLOOD PRESSURE: 88 MMHG | HEART RATE: 76 BPM | SYSTOLIC BLOOD PRESSURE: 148 MMHG | RESPIRATION RATE: 18 BRPM | OXYGEN SATURATION: 99 %

## 2022-01-19 DIAGNOSIS — R94.39 ABNORMAL NUCLEAR STRESS TEST: ICD-10-CM

## 2022-01-19 PROCEDURE — C1769 GUIDE WIRE: HCPCS | Performed by: INTERNAL MEDICINE

## 2022-01-19 PROCEDURE — 74011250636 HC RX REV CODE- 250/636: Performed by: INTERNAL MEDICINE

## 2022-01-19 PROCEDURE — 99153 MOD SED SAME PHYS/QHP EA: CPT | Performed by: INTERNAL MEDICINE

## 2022-01-19 PROCEDURE — 77030042317 HC BND COMPR HEMSTAT -B: Performed by: INTERNAL MEDICINE

## 2022-01-19 PROCEDURE — C1894 INTRO/SHEATH, NON-LASER: HCPCS | Performed by: INTERNAL MEDICINE

## 2022-01-19 PROCEDURE — 77030013797 HC KT TRNSDUC PRSSR EDWD -A: Performed by: INTERNAL MEDICINE

## 2022-01-19 PROCEDURE — 93458 L HRT ARTERY/VENTRICLE ANGIO: CPT | Performed by: INTERNAL MEDICINE

## 2022-01-19 PROCEDURE — 77030013761 HC KT HRT LFT ANGI -B: Performed by: INTERNAL MEDICINE

## 2022-01-19 PROCEDURE — 99152 MOD SED SAME PHYS/QHP 5/>YRS: CPT | Performed by: INTERNAL MEDICINE

## 2022-01-19 PROCEDURE — 74011250637 HC RX REV CODE- 250/637: Performed by: INTERNAL MEDICINE

## 2022-01-19 PROCEDURE — 74011000636 HC RX REV CODE- 636: Performed by: INTERNAL MEDICINE

## 2022-01-19 PROCEDURE — 74011000250 HC RX REV CODE- 250: Performed by: INTERNAL MEDICINE

## 2022-01-19 PROCEDURE — 77030012597: Performed by: INTERNAL MEDICINE

## 2022-01-19 PROCEDURE — 77030015766: Performed by: INTERNAL MEDICINE

## 2022-01-19 RX ORDER — LIDOCAINE HYDROCHLORIDE 10 MG/ML
INJECTION INFILTRATION; PERINEURAL AS NEEDED
Status: DISCONTINUED | OUTPATIENT
Start: 2022-01-19 | End: 2022-01-19 | Stop reason: HOSPADM

## 2022-01-19 RX ORDER — HEPARIN SODIUM 1000 [USP'U]/ML
INJECTION, SOLUTION INTRAVENOUS; SUBCUTANEOUS AS NEEDED
Status: DISCONTINUED | OUTPATIENT
Start: 2022-01-19 | End: 2022-01-19 | Stop reason: HOSPADM

## 2022-01-19 RX ORDER — HEPARIN SODIUM 200 [USP'U]/100ML
INJECTION, SOLUTION INTRAVENOUS AS NEEDED
Status: DISCONTINUED | OUTPATIENT
Start: 2022-01-19 | End: 2022-01-19 | Stop reason: HOSPADM

## 2022-01-19 RX ORDER — FENTANYL CITRATE 50 UG/ML
INJECTION, SOLUTION INTRAMUSCULAR; INTRAVENOUS AS NEEDED
Status: DISCONTINUED | OUTPATIENT
Start: 2022-01-19 | End: 2022-01-19 | Stop reason: HOSPADM

## 2022-01-19 RX ORDER — GUAIFENESIN 100 MG/5ML
324 LIQUID (ML) ORAL ONCE
Status: COMPLETED | OUTPATIENT
Start: 2022-01-19 | End: 2022-01-19

## 2022-01-19 RX ORDER — VERAPAMIL HYDROCHLORIDE 2.5 MG/ML
INJECTION, SOLUTION INTRAVENOUS AS NEEDED
Status: DISCONTINUED | OUTPATIENT
Start: 2022-01-19 | End: 2022-01-19 | Stop reason: HOSPADM

## 2022-01-19 RX ORDER — GUAIFENESIN 100 MG/5ML
LIQUID (ML) ORAL
Status: DISCONTINUED
Start: 2022-01-19 | End: 2022-01-19 | Stop reason: HOSPADM

## 2022-01-19 RX ORDER — MIDAZOLAM HYDROCHLORIDE 1 MG/ML
INJECTION, SOLUTION INTRAMUSCULAR; INTRAVENOUS AS NEEDED
Status: DISCONTINUED | OUTPATIENT
Start: 2022-01-19 | End: 2022-01-19 | Stop reason: HOSPADM

## 2022-01-19 RX ADMIN — ASPIRIN 81 MG CHEWABLE TABLET 324 MG: 81 TABLET CHEWABLE at 10:01

## 2022-01-19 NOTE — DISCHARGE INSTRUCTIONS
DISCHARGE SUMMARY from Nurse    PATIENT INSTRUCTIONS:    After general anesthesia or intravenous sedation, for 24 hours or while taking prescription Narcotics:  · Limit your activities  · Do not drive and operate hazardous machinery  · Do not make important personal or business decisions  · Do  not drink alcoholic beverages  · If you have not urinated within 8 hours after discharge, please contact your surgeon on call. Report the following to your surgeon:  · Excessive pain, swelling, redness or odor of or around the surgical area  · Temperature over 100.5  · Nausea and vomiting lasting longer than 4 hours or if unable to take medications  · Any signs of decreased circulation or nerve impairment to extremity: change in color, persistent  numbness, tingling, coldness or increase pain  · Any questions    What to do at Home:  Recommended activity: Activity as tolerated and no driving for today,     These are general instructions for a healthy lifestyle:    No smoking/ No tobacco products/ Avoid exposure to second hand smoke  Surgeon General's Warning:  Quitting smoking now greatly reduces serious risk to your health. Obesity, smoking, and sedentary lifestyle greatly increases your risk for illness    A healthy diet, regular physical exercise & weight monitoring are important for maintaining a healthy lifestyle    You may be retaining fluid if you have a history of heart failure or if you experience any of the following symptoms:  Weight gain of 3 pounds or more overnight or 5 pounds in a week, increased swelling in our hands or feet or shortness of breath while lying flat in bed. Please call your doctor as soon as you notice any of these symptoms; do not wait until your next office visit. The discharge information has been reviewed with the patient. The patient verbalized understanding.   Discharge medications reviewed with the patient and appropriate educational materials and side effects teaching were provided. ___________________________________________________________________________________________________________________________________HEART CATHETERIZATION/ANGIOGRAPHY DISCHARGE INSTRUCTIONS    1. Check puncture site frequently for swelling or bleeding. If there is any bleeding, lie down and apply pressure over the area with a clean towel or washcloth. Notify your doctor for any redness, swelling, drainage, or oozing from the puncture site. Notify your doctor for any fever or chills. 2. If the extremity becomes cold, numb, or painful call Dr. Reta Brown  3. Activity should be limited for the next 48 hours. Climb stairs as little as possible and avoid any stooping, bending, or strenuous activity for 48 hours. No heavy lifting (anything over 10 pounds) for 3 days. 4. You may resume your usual diet. Drink more fluids than usual.  5. Have a responsible person drive you home and stay with you for at least 24 hours after your heart catheterization/angiography. 6. You may remove bandage from your Right Wrist in 24 hours. You may shower in 24 hours. No tub baths, hot tubs, or swimming for 1 week. Do not place any lotions, creams, powders, or ointments over puncture site for 1 week. You may place a clean band-aid over the puncture site each day for 5 days. Change daily. I have read the above instructions and have had the opportunity to ask questions.       Patient: ________________________   Date: 1/19/2022    Witness: _______________________   Date: 1/19/2022

## 2022-01-19 NOTE — Clinical Note
TRANSFER - OUT REPORT:     Verbal report given to: amer[place]. Report consisted of patient's Situation, Background, Assessment and   Recommendations(SBAR). Opportunity for questions and clarification was provided. Patient transported with a Registered Nurse. Patient transported to: holding area.

## 2022-01-19 NOTE — PROGRESS NOTES
TRANSFER - IN REPORT:    Verbal report received from Rupture on Enmanuel Whaley  being received from [facility] for routine post - op      Report consisted of patients Situation, Background, Assessment and   Recommendations(SBAR). Information from the following report(s) SBAR, Procedure Summary and MAR was reviewed with the receiving nurse. Opportunity for questions and clarification was provided. Assessment completed upon patients arrival to unit and care assumed.

## 2022-01-19 NOTE — PROGRESS NOTES
TR Band removed, no bleeding or hematoma formation noted.  Pressure dressing applied ( quick clot , Tegaderm, coband dressing )

## 2022-01-19 NOTE — PROGRESS NOTES
TRANSFER - OUT REPORT:    Verbal report given to RICKY LOUIS  on [facility]  being transferred to [facility] for ordered procedure       Report consisted of patients Situation, Background, Assessment and   Recommendations(SBAR). Information from the following report(s) SBAR, Procedure Summary and MAR was reviewed with the receiving nurse. Lines:       Opportunity for questions and clarification was provided.       Patient transported with:   [employer]

## 2022-01-19 NOTE — Clinical Note
TRANSFER - IN REPORT:     Verbal report received from: [address] [place] [address]. Report consisted of patient's Situation, Background, Assessment and   Recommendations(SBAR). Opportunity for questions and clarification was provided. Assessment completed upon patient's arrival to unit and care assumed. Patient transported with a Registered Nurse.

## 2022-01-19 NOTE — H&P
Please see clinic note for detail. I saw and examined patient and confirmed   No interval change. Labs reviewed. Procedure explained to patient and all risk and benefit discussed with patient. Risk, benefit, complication of LHC and possible PCI ( including but not limited to bleeding, infection, heart failure, stroke, MI, emergent bypass surgery, kidney failure, dialysis and death ) were discussed with patient and willing to proceed with procedure. Proceed as planned. DW wife as well    History and physical has been reviewed.  There have been no significant clinical changes since the completion of the originally dated History and Physical.  Will be using moderate sedation.    ------------------------------------------------------------------------------------------------------------------

## 2022-01-19 NOTE — Clinical Note
Contrast Dose Calculator:   Patient's age: 67.   Patient's sex: Male. Patient weight (kg) = 97. Creatinine level (mg/dL) = 0.95. Creatinine clearance (mL/min): 89.   Contrast concentration (mg/mL) = 300. MACD = 300 mL. Max Contrast dose per Creatinine Cl calculator = 200.25 mL.

## 2022-01-20 ENCOUNTER — TELEPHONE (OUTPATIENT)
Dept: CARDIOLOGY CLINIC | Age: 77
End: 2022-01-20

## 2022-01-20 DIAGNOSIS — R07.9 CHEST PAIN, UNSPECIFIED TYPE: Primary | ICD-10-CM

## 2022-01-20 NOTE — TELEPHONE ENCOUNTER
Patient information regarding open heart surgery to be done at [facility].  Patient had cardiac cardiac cath done on 01/19

## 2022-01-21 NOTE — TELEPHONE ENCOUNTER
----- Message from Corinna Barnes MD sent at 1/19/2022  1:27 PM EST -----  Patient just had cath  Going home  Will need follow up with me in 2 weeks    Also needs CT surgery referral for CABG eval at [facility].     Can we send referral pls    Thanks

## 2022-01-21 NOTE — TELEPHONE ENCOUNTER
Reached out to patient. Patient is scheduled for 2 week f/u with Krishna Ellison to discuss upcoming CABG. He is scheduled for 2/7/22 at 9:45AM. Will put referral in for Cruz Merrill.

## 2022-02-02 ENCOUNTER — TELEPHONE (OUTPATIENT)
Dept: CARDIOLOGY CLINIC | Age: 77
End: 2022-02-02

## 2022-02-07 ENCOUNTER — OFFICE VISIT (OUTPATIENT)
Dept: CARDIOLOGY CLINIC | Age: 77
End: 2022-02-07
Payer: MEDICARE

## 2022-02-07 VITALS
DIASTOLIC BLOOD PRESSURE: 80 MMHG | BODY MASS INDEX: 35.65 KG/M2 | OXYGEN SATURATION: 98 % | HEIGHT: 65 IN | SYSTOLIC BLOOD PRESSURE: 128 MMHG | HEART RATE: 92 BPM | WEIGHT: 214 LBS

## 2022-02-07 DIAGNOSIS — I25.10 CORONARY ARTERY DISEASE DUE TO LIPID RICH PLAQUE: Primary | ICD-10-CM

## 2022-02-07 DIAGNOSIS — I25.5 ISCHEMIC CARDIOMYOPATHY: ICD-10-CM

## 2022-02-07 DIAGNOSIS — I10 ESSENTIAL HYPERTENSION WITH GOAL BLOOD PRESSURE LESS THAN 140/90: ICD-10-CM

## 2022-02-07 DIAGNOSIS — E78.00 PURE HYPERCHOLESTEROLEMIA: ICD-10-CM

## 2022-02-07 DIAGNOSIS — I25.83 CORONARY ARTERY DISEASE DUE TO LIPID RICH PLAQUE: Primary | ICD-10-CM

## 2022-02-07 PROCEDURE — 1101F PT FALLS ASSESS-DOCD LE1/YR: CPT | Performed by: INTERNAL MEDICINE

## 2022-02-07 PROCEDURE — G8417 CALC BMI ABV UP PARAM F/U: HCPCS | Performed by: INTERNAL MEDICINE

## 2022-02-07 PROCEDURE — G8510 SCR DEP NEG, NO PLAN REQD: HCPCS | Performed by: INTERNAL MEDICINE

## 2022-02-07 PROCEDURE — G8754 DIAS BP LESS 90: HCPCS | Performed by: INTERNAL MEDICINE

## 2022-02-07 PROCEDURE — G8428 CUR MEDS NOT DOCUMENT: HCPCS | Performed by: INTERNAL MEDICINE

## 2022-02-07 PROCEDURE — 99214 OFFICE O/P EST MOD 30 MIN: CPT | Performed by: INTERNAL MEDICINE

## 2022-02-07 PROCEDURE — G8536 NO DOC ELDER MAL SCRN: HCPCS | Performed by: INTERNAL MEDICINE

## 2022-02-07 PROCEDURE — G8752 SYS BP LESS 140: HCPCS | Performed by: INTERNAL MEDICINE

## 2022-02-07 RX ORDER — NITROGLYCERIN 0.4 MG/1
0.4 TABLET SUBLINGUAL
Qty: 25 TABLET | Refills: 3 | Status: SHIPPED | OUTPATIENT
Start: 2022-02-07

## 2022-02-07 RX ORDER — LISINOPRIL 2.5 MG/1
2.5 TABLET ORAL DAILY
Qty: 30 TABLET | Refills: 3 | Status: SHIPPED | OUTPATIENT
Start: 2022-02-07 | End: 2022-02-10 | Stop reason: SINTOL

## 2022-02-07 RX ORDER — LISINOPRIL 2.5 MG/1
2.5 TABLET ORAL DAILY
Qty: 30 TABLET | Refills: 6 | Status: CANCELLED | OUTPATIENT
Start: 2022-02-07

## 2022-02-07 NOTE — PATIENT INSTRUCTIONS
Take Lasix 40mg twice a day for 2 days, then cut back to 40mg a day as normal  Start taking Lisinipril 2.5 mg a day  Start taking Nitroglycerin 0.4 mg SL for chest pain as needed (read instructions with pharmacy refill)

## 2022-02-07 NOTE — PROGRESS NOTES
Shiloh Montano presents today for   Chief Complaint   Patient presents with    Follow-up     2 week post cath       Shiloh Montano preferred language for health care discussion is english/other. Is someone accompanying this pt? no    Is the patient using any DME equipment during [address]? no    Depression Screening:  3 most recent PHQ Screens 2/7/2022   Little interest or pleasure in doing things Not at all   Feeling down, depressed, irritable, or hopeless Not at all   Total Score PHQ 2 0       Learning Assessment:  Learning Assessment 2/7/2022   PRIMARY LEARNER Patient   PRIMARY LANGUAGE ENGLISH   LEARNER PREFERENCE PRIMARY DEMONSTRATION   ANSWERED BY patient   RELATIONSHIP SELF       Abuse Screening:  Abuse Screening Questionnaire 2/7/2022   Do you ever feel afraid of your partner? N   Are you in a relationship with someone who physically or mentally threatens you? N   Is it safe for you to go home? Y       Fall Risk  Fall Risk Assessment, last 12 mths 2/7/2022   Able to walk? Yes   Fall in past 12 months? 0   Do you feel unsteady? 0   Are you worried about falling 0           Pt currently taking Anticoagulant therapy? no    Pt currently taking Antiplatelet therapy ? no      Coordination of Care:  1. Have you been to the ER, urgent care clinic since your last visit? Hospitalized since your last visit? no    2. Have you seen or consulted any other health care providers outside of the [address] [place] [address] since your last visit? Include any pap smears or colon screening.  no

## 2022-02-08 RX ORDER — FUROSEMIDE 40 MG/1
40 TABLET ORAL DAILY
Qty: 94 TABLET | Refills: 3 | Status: SHIPPED | OUTPATIENT
Start: 2022-02-08 | End: 2022-03-21 | Stop reason: ALTCHOICE

## 2022-02-09 ENCOUNTER — TELEPHONE (OUTPATIENT)
Dept: CARDIOLOGY CLINIC | Age: 77
End: 2022-02-09

## 2022-02-09 NOTE — TELEPHONE ENCOUNTER
BP: 100/ 50. Patient was in ER last night at [facility]. Patients' wife says that she is very concerned with his condition. Hospital says it might all be a pulmonary problem. She feels like the medication may be the result .

## 2022-02-10 NOTE — TELEPHONE ENCOUNTER
Patient wife is very very concerned about her . His BP is low, she says he's very pale, and cold. Believes he'll have a heart ache. I have left a few messages, some high priority and the patients' wife has still not heard back from anyone. Mrs. Ellen Bronson says that if she does not hear from anyone by noon she is coming to the office.

## 2022-02-10 NOTE — TELEPHONE ENCOUNTER
Patient is scheduled for Monday because he is scheduled to have open heart surgery on the 23rd, but wife needs to speak to someone ASAP

## 2022-02-10 NOTE — TELEPHONE ENCOUNTER
Incoming from pts wife. Two patient Identifiers confirmed. Patients wife stated pts BP was 100/55  x two days ago and 76/46  \"dizziness, sob, headaches, and color is pasty\" Pt stated pts coughing is more acute. Pt stated they went to the ER ( [address] [place] [address]) Tuesday due to dizziness but pt did not stay stay due to 5 hr wait and is sleeping 12 hour a day and taking naps in between. Pt denied any numbness and tingling. Pt is scheduled for open heart surgery on 2/23/2022. Advised I would review with Dr Calli Brewster for review.

## 2022-02-10 NOTE — TELEPHONE ENCOUNTER
Contacted pts wife at [facility] number. Two patient Identifiers confirmed. Advised pt per Dr Marvel Seymour. Pts wife stated pt had taken hctz x 3 days with lasix  instead of doubling up on the lasix as advised. Advised I would make provider aware. Pts wife verbalized understanding.

## 2022-02-14 ENCOUNTER — OFFICE VISIT (OUTPATIENT)
Dept: CARDIOLOGY CLINIC | Age: 77
End: 2022-02-14
Payer: MEDICARE

## 2022-02-14 VITALS
OXYGEN SATURATION: 95 % | BODY MASS INDEX: 35.82 KG/M2 | HEIGHT: 65 IN | DIASTOLIC BLOOD PRESSURE: 60 MMHG | WEIGHT: 215 LBS | HEART RATE: 96 BPM | SYSTOLIC BLOOD PRESSURE: 108 MMHG

## 2022-02-14 DIAGNOSIS — I10 ESSENTIAL HYPERTENSION WITH GOAL BLOOD PRESSURE LESS THAN 140/90: ICD-10-CM

## 2022-02-14 DIAGNOSIS — I25.10 CORONARY ARTERY DISEASE DUE TO LIPID RICH PLAQUE: Primary | ICD-10-CM

## 2022-02-14 DIAGNOSIS — I25.5 ISCHEMIC CARDIOMYOPATHY: ICD-10-CM

## 2022-02-14 DIAGNOSIS — I25.83 CORONARY ARTERY DISEASE DUE TO LIPID RICH PLAQUE: Primary | ICD-10-CM

## 2022-02-14 PROCEDURE — G8428 CUR MEDS NOT DOCUMENT: HCPCS | Performed by: INTERNAL MEDICINE

## 2022-02-14 PROCEDURE — G8536 NO DOC ELDER MAL SCRN: HCPCS | Performed by: INTERNAL MEDICINE

## 2022-02-14 PROCEDURE — 99214 OFFICE O/P EST MOD 30 MIN: CPT | Performed by: INTERNAL MEDICINE

## 2022-02-14 PROCEDURE — G8432 DEP SCR NOT DOC, RNG: HCPCS | Performed by: INTERNAL MEDICINE

## 2022-02-14 PROCEDURE — G8417 CALC BMI ABV UP PARAM F/U: HCPCS | Performed by: INTERNAL MEDICINE

## 2022-02-14 PROCEDURE — 1101F PT FALLS ASSESS-DOCD LE1/YR: CPT | Performed by: INTERNAL MEDICINE

## 2022-02-14 PROCEDURE — G8754 DIAS BP LESS 90: HCPCS | Performed by: INTERNAL MEDICINE

## 2022-02-14 PROCEDURE — G8752 SYS BP LESS 140: HCPCS | Performed by: INTERNAL MEDICINE

## 2022-02-14 NOTE — LETTER
2/14/2022    Patient: Brandi Alanis   YOB: 1945   Date of Visit: 2/14/2022     Kristina Arredondo MD  [address] [place] [address]  Via Fax: [phone]    Dear Kristina Arredondo MD,      Thank you for referring Mr. Servando Arias to [address] [place] [address] for evaluation. My notes for this consultation are attached. If you have questions, please do not hesitate to call me. I look forward to following your patient along with you.       Sincerely,    Jes Dukes MD

## 2022-02-14 NOTE — PROGRESS NOTES
[facility]    Mr. Tatum Alvarado is 67 y.o. male with history of CAD: Diabetes, hypertension, hyperlipidemia    Patient is here today for cardiac evaluation. He denies any prior history of MI or CHF. Because of cardiac symptoms, patient underwent cardiac catheterization and 01/2022 which showed multivessel CAD. Patient has been referred to CT surgery team.    Patient is scheduled to have a CABG in 2 weeks    Last week patient had dizziness. He went to emergency department however because of prolonged wait time, and the left the hospital.  Because of low blood pressure, his lisinopril was discontinued. Overall his dyspnea and lower extremity swelling is improving. He is taking medication as prescribed  Denies any chest pain or chest tightness since last visit. Denies any use of nitroglycerin. Denies any nausea, vomiting, abdominal pain, fever, chills, sputum production. No hematuria or other bleeding complaints    Past Medical History:   Diagnosis Date    Diabetes ([facility] [address]     HLD (hyperlipidemia)     Hypertension     Thyroid disease        Review of Systems:  Cardiac symptoms as noted above in HPI. All others negative. Denies fatigue, malaise, skin rash, joint pain, blurring vision, photophobia, neck pain, hemoptysis, chronic cough, nausea, vomiting, hematuria, burning micturition, BRBPR, chronic headaches. Current Outpatient Medications   Medication Sig    furosemide (LASIX) 40 mg tablet Take 1 Tablet by mouth daily. Or as directed.  nitroglycerin (NITROSTAT) 0.4 mg SL tablet 1 Tablet by SubLINGual route every five (5) minutes as needed for Chest Pain. Up to 3 doses.  carvediloL (COREG) 3.125 mg tablet Take 1 Tablet by mouth two (2) times daily (with meals). Indications: high blood pressure    aspirin delayed-release 81 mg tablet Take 1 Tablet by mouth daily.     isosorbide mononitrate ER (IMDUR) 30 mg tablet Take 1 Tablet by mouth every morning.  atorvastatin (LIPITOR) 40 mg tablet Take 40 mg by mouth daily. Indications: high cholesterol     No current facility-administered medications for this visit. Past Surgical History:   Procedure Laterality Date    COLONOSCOPY N/A 3/18/2019    COLONOSCOPY performed [facility] Julissa Sinclair MD at [facility]COPY    COLONOSCOPY N/A 5/3/2016    COLONOSCOPY with hot snare polypectomy and cold forceps polypectomy performed by Christella Sandhoff, MD at [facility] ENDOSCOPY    HX GI      hernia repair and colonoscopy       Allergies and Sensitivities:  Allergies   Allergen Reactions    Caffeine Anaphylaxis       Family History:  No family history on file. Social History:  Social History     Tobacco Use    Smoking status: Former Smoker    Smokeless tobacco: Never Used   Substance Use Topics    Alcohol use: Yes     Alcohol/week: 3.0 standard drinks     Types: 3 Shots of liquor per week     Comment: socially    Drug use: No     He  reports that he has quit smoking. He has never used smokeless tobacco.  He  reports current alcohol use of about 3.0 standard drinks of alcohol per week. Physical Exam:  BP Readings from Last 3 Encounters:   02/14/22 108/60   02/07/22 128/80   01/19/22 (!) 148/88         Pulse Readings from Last 3 Encounters:   02/14/22 96   02/07/22 92   01/19/22 76          Wt Readings from Last 3 Encounters:   02/14/22 97.5 kg (215 lb)   02/07/22 97.1 kg (214 lb)   01/11/22 97.1 kg (214 lb)       Constitutional: Oriented to person, place, and time. HENT: Head: Normocephalic and atraumatic. Neck: No JVD present. Carotid bruit is not appreciated. Cardiovascular: Regular rhythm. No murmur, gallop or rubs appreciated  Lung: Breath sounds normal. No respiratory distress. No ronchi or rales appreciated  Abdominal: No tenderness. No rebound and no guarding. Musculoskeletal: There is 1+ lower extremity edema.  No cynosis    LABS:   @  Lab Results   Component Value Date/Time    WBC 8.0 2022 12:13 PM    HGB 15.0 2022 12:13 PM    HCT 45.3 2022 12:13 PM    PLATELET 257  12:13 PM    MCV 97.0 2022 12:13 PM     Lab Results   Component Value Date/Time    Sodium 138 2022 12:13 PM    Potassium 4.0 2022 12:13 PM    Chloride 101 2022 12:13 PM    CO2 31 2022 12:13 PM    Glucose 174 (H) 2022 12:13 PM    BUN 13 2022 12:13 PM    Creatinine 0.95 2022 12:13 PM     No flowsheet data found. Lab Results   Component Value Date/Time    ALT (SGPT) 37 2022 12:13 PM     No results found for: HBA1C, SMY3XDEQ, PCB6TKNG, FZT7JEDP  No results found for: TSH, TSH2, TSH3, TSHP, TSHEXT, TSHEXT    EK/10/2022: Sinus rhythm at 90 bpm.  Poor R wave progression. PVC noted. NUCLEAR CARDIAC STRESS TEST 2022    Interpretation Summary    Nuclear Findings: LVEF measures 40%. TID ratio is 1.15.    Nuclear Findings: LV perfusion is abnormal. There is evidence of inducible ischemia.   Nuclear Findings: There is a moderate severity left ventricular stress perfusion defect that is medium in size present in the basal to mid inferolateral segment(s) that is partially reversible. The defect is consistent with abnormal perfusion in the LCx territory. Viability in the area is moderate. There is abnormal wall motion in the defect area. The defect appears to be probable ischemia.   Nuclear Findings: Abnormal left ventricular systolic function post-stress. Global function is mildly reduced. Single regional abnormality during stress. LVEF measures 40%.   Nuclear Findings: The study is most consistent with myocardial ischemia. Findings suggest a moderate risk of myocardial ischemia. ECHO ADULT COMPLETE 2022  Interpretation Summary    Left Ventricle: Left ventricle size is normal. Moderately increased wall thickness. Global hypokinesis present.  Moderately reduced left ventricular systolic function with a visually estimated EF of 40 - 45%. Grade II diastolic dysfunction with increased LAP.   Mitral Valve: Mildly thickened leaflets.   Left Atrium: Left atrium is dilated. Left atrial volume index is mildly increased (35-41 mL/m2). CARDIAC Cath (01/2022)  Conclusion  LM: Distal 10%   LAD: Proximal calcified 60 to 70% eccentric stenosis followed by mid vessel 75% tubular stenosis  Ramus/high OM: Mid vessel 70% stenosis  LCx: Mid LCx extending into ostial OM eccentric calcified 80% stenosis, mid OM 70% stenosis  RCA: Ostial and proximal 100% occlusion with collaterals supplying large caliber distal vessel  Mildly reduced LVEF at 45-50%  LVEDP of 16 mmHg  We will refer patient for CABG evaluation especially with diabetes and mild LV dysfunction  Aggressive medical    IMPRESSION & PLAN:  Mr. Halina Baum is a 67 y.o. with multiple medical problem    CAD:  Cardiac catheterization in 01/2022 showed three-vessel disease as mentioned above  CABG scheduled in 2 weeks  Continue aspirin, beta-blocker, Imdur  Currently without any angina    Ischemic cardiomyopathy:  LVEF 40-45% echocardiogram and cardiac catheterization in 01/2022. Currently on Imdur and Coreg. Continue Coreg. unable to tolerate lisinopril because of hypotension  Slight fluid overload on exam.  Currently on Lasix 40 mg daily. Have asked patient to take Lasix 40 g twice daily over the next 3 to 4 days and once edema improved, reduce back to once a day. Salt and fluid restriction advised again during this visit. Have asked patient to check weight every morning and decide her Lasix dose. Signs and symptoms of fluid overload and dehydration discussed with the patient and the wife today again    Hypertension:  /60. Continue current medication. Diabetes:  Goal hemoglobin A1c less than 7 is recommended from cardiovascular standpoint. Hyperlipidemia: This is being managed by PCP. Currently on atorvastatin 20 mg daily.   Continue same    Importance of diet and exercise was discussed with patient. This plan was discussed with patient who is in agreement. Thank you for allowing me to participate in patient care. Please feel free to call me if you have any question or concern. Annabell Kussmaul, MD  Please note: This document has been produced using voice recognition software. Unrecognized errors in transcription may be present.

## 2022-02-14 NOTE — PROGRESS NOTES
Niraj Hannah presents today for   Chief Complaint   Patient presents with    Follow-up    Surgical Clearance     open heart surgery 2/23/22       Niraj Hannah preferred language for health care discussion is english/other. Is someone accompanying this pt? Wife     Is the patient using any DME equipment during [address]? no    Depression Screening:  3 most recent PHQ Screens 2/7/2022   Little interest or pleasure in doing things Not at all   Feeling down, depressed, irritable, or hopeless Not at all   Total Score PHQ 2 0       Learning Assessment:  Learning Assessment 2/7/2022   PRIMARY LEARNER Patient   PRIMARY LANGUAGE ENGLISH   LEARNER PREFERENCE PRIMARY DEMONSTRATION   ANSWERED BY patient   RELATIONSHIP SELF       Abuse Screening:  Abuse Screening Questionnaire 2/7/2022   Do you ever feel afraid of your partner? N   Are you in a relationship with someone who physically or mentally threatens you? N   Is it safe for you to go home? Y       Fall Risk  Fall Risk Assessment, last 12 mths 2/7/2022   Able to walk? Yes   Fall in past 12 months? 0   Do you feel unsteady? 0   Are you worried about falling 0       Pt currently taking Anticoagulant therapy? ASA 81mg every day     Coordination of Care:  1. Have you been to the ER, urgent care clinic since your last visit? Hospitalized since your last visit? 2/8/22 for reaction to medications     2. Have you seen or consulted any other health care providers outside of the [address] [place] [address] since your last visit? Include any pap smears or colon screening.  no

## 2022-03-03 ENCOUNTER — TELEPHONE (OUTPATIENT)
Dept: CARDIOLOGY CLINIC | Age: 77
End: 2022-03-03

## 2022-03-03 NOTE — TELEPHONE ENCOUNTER
Received call from [facility] stating patient has been started on Coumadin for afib post CABG. Requesting our office to manage his Coumadin dosing. He is a patient of Dr Gulshan Roe seen at [facility] office. He will be going home with home health and home health will be calling our office next week with his INR for Coumadin dosing. His INR range is 2-3.

## 2022-03-08 ENCOUNTER — TELEPHONE (OUTPATIENT)
Dept: CARDIOLOGY CLINIC | Age: 77
End: 2022-03-08

## 2022-03-08 RX ORDER — AMIODARONE HYDROCHLORIDE 200 MG/1
200 TABLET ORAL
COMMUNITY
End: 2022-03-30 | Stop reason: SDUPTHER

## 2022-03-08 RX ORDER — WARFARIN 2 MG/1
2 TABLET ORAL DAILY
COMMUNITY
End: 2022-03-18 | Stop reason: SDUPTHER

## 2022-03-08 NOTE — TELEPHONE ENCOUNTER
Patient was due for inr today. However, he was not able to do so. He did not take his coumadin because he could not get it from the pharmacy. Pharmacy says that the coumadin conflicts with another one of his meds.  Please call Paty Allan with [facility] at [phone]

## 2022-03-08 NOTE — TELEPHONE ENCOUNTER
Contacted pharmacy. Two patient Identifiers confirmed. Advised per note from [facility]. Per pharmacist the conflict was with amiodarone and coumadin. She stated dispense qty was only 20 for amiodarone. Will advise HHN.

## 2022-03-08 NOTE — TELEPHONE ENCOUNTER
Contacted pts wife at number. Two patient Identifiers confirmed. Advised pt per Dr John Wynn and pharmacy medication to be filed. Pts wife verbalized understanding.

## 2022-03-08 NOTE — TELEPHONE ENCOUNTER
Contacted HHN. Two patient Identifiers confirmed. Advised per pharmacy and pt notes. HHN verbalized understanding.

## 2022-03-08 NOTE — TELEPHONE ENCOUNTER
Contacted pts wife at [facility] number. Two patient Identifiers confirmed. Advised pt per pharmacy notes and that I needed to confirm pt was taking amiodoarone. Pts wife stated he was taking amiodarone 200 mg daily and has 1 refill. Will advise HHN and review with Dr Jorge Pastrana. Ptverbalized understanding.

## 2022-03-08 NOTE — TELEPHONE ENCOUNTER
Contacted pharmacy. Two patient Identifiers confirmed. Advised pt per Dr Calli Brewster. Pharmacist verbalized understanding.

## 2022-03-08 NOTE — TELEPHONE ENCOUNTER
Verbal order and read back per MD Mariel Murrieta to fill coumadin pt will need frequent INR checks while on both amiodarone and coumadin

## 2022-03-11 ENCOUNTER — TELEPHONE ANTICOAG (OUTPATIENT)
Dept: CARDIOLOGY CLINIC | Age: 77
End: 2022-03-11

## 2022-03-11 LAB — INR, EXTERNAL: 1.6 (ref 2–3)

## 2022-03-11 NOTE — PROGRESS NOTES
Verbal order and read back per Fe Christianson NP  INR 1.6  Coumadin started 3/8/22. Coumadin 2mg daily. Recheck INR 3/15/22 per home health. Patient's home health nurse made aware of dosing and recheck instructions, no questions.

## 2022-03-18 ENCOUNTER — TELEPHONE ANTICOAG (OUTPATIENT)
Dept: CARDIOLOGY CLINIC | Age: 77
End: 2022-03-18

## 2022-03-18 LAB — INR, EXTERNAL: 1.7

## 2022-03-18 RX ORDER — WARFARIN 2 MG/1
TABLET ORAL
Qty: 45 TABLET | Refills: 6 | Status: SHIPPED | OUTPATIENT
Start: 2022-03-18 | End: 2022-03-21 | Stop reason: ALTCHOICE

## 2022-03-18 NOTE — PROGRESS NOTES
Verbal order and read back per Arine Friday, NP  iNR below therapeutic range  Change to Coumadin 2mg daily except Coumadin 3mg on Tuesdays and Fridays. Recheck INR in 1 week per home health. Patient's home health nurse made aware of dosing and recheck instructions, no questions.

## 2022-03-21 ENCOUNTER — OFFICE VISIT (OUTPATIENT)
Dept: CARDIOLOGY CLINIC | Age: 77
End: 2022-03-21
Payer: MEDICARE

## 2022-03-21 VITALS
DIASTOLIC BLOOD PRESSURE: 60 MMHG | HEIGHT: 65 IN | HEART RATE: 90 BPM | OXYGEN SATURATION: 98 % | BODY MASS INDEX: 34.32 KG/M2 | WEIGHT: 206 LBS | SYSTOLIC BLOOD PRESSURE: 94 MMHG

## 2022-03-21 DIAGNOSIS — Z95.1 HX OF CABG: ICD-10-CM

## 2022-03-21 DIAGNOSIS — I25.83 CORONARY ARTERY DISEASE DUE TO LIPID RICH PLAQUE: Primary | ICD-10-CM

## 2022-03-21 DIAGNOSIS — R07.9 CHEST PAIN, UNSPECIFIED TYPE: ICD-10-CM

## 2022-03-21 DIAGNOSIS — I25.5 ISCHEMIC CARDIOMYOPATHY: ICD-10-CM

## 2022-03-21 DIAGNOSIS — I25.10 CORONARY ARTERY DISEASE DUE TO LIPID RICH PLAQUE: Primary | ICD-10-CM

## 2022-03-21 DIAGNOSIS — I48.91 ATRIAL FIBRILLATION, UNSPECIFIED TYPE (HCC): ICD-10-CM

## 2022-03-21 DIAGNOSIS — E78.00 PURE HYPERCHOLESTEROLEMIA: ICD-10-CM

## 2022-03-21 DIAGNOSIS — I10 ESSENTIAL HYPERTENSION WITH GOAL BLOOD PRESSURE LESS THAN 140/90: ICD-10-CM

## 2022-03-21 PROCEDURE — G8428 CUR MEDS NOT DOCUMENT: HCPCS | Performed by: INTERNAL MEDICINE

## 2022-03-21 PROCEDURE — G8510 SCR DEP NEG, NO PLAN REQD: HCPCS | Performed by: INTERNAL MEDICINE

## 2022-03-21 PROCEDURE — G8536 NO DOC ELDER MAL SCRN: HCPCS | Performed by: INTERNAL MEDICINE

## 2022-03-21 PROCEDURE — G8752 SYS BP LESS 140: HCPCS | Performed by: INTERNAL MEDICINE

## 2022-03-21 PROCEDURE — 1101F PT FALLS ASSESS-DOCD LE1/YR: CPT | Performed by: INTERNAL MEDICINE

## 2022-03-21 PROCEDURE — 99214 OFFICE O/P EST MOD 30 MIN: CPT | Performed by: INTERNAL MEDICINE

## 2022-03-21 PROCEDURE — 93000 ELECTROCARDIOGRAM COMPLETE: CPT | Performed by: INTERNAL MEDICINE

## 2022-03-21 PROCEDURE — G8417 CALC BMI ABV UP PARAM F/U: HCPCS | Performed by: INTERNAL MEDICINE

## 2022-03-21 PROCEDURE — G8754 DIAS BP LESS 90: HCPCS | Performed by: INTERNAL MEDICINE

## 2022-03-21 RX ORDER — BUMETANIDE 1 MG/1
1 TABLET ORAL 2 TIMES DAILY
COMMUNITY
Start: 2022-03-06 | End: 2022-05-16

## 2022-03-21 RX ORDER — ATORVASTATIN CALCIUM 80 MG/1
80 TABLET, FILM COATED ORAL DAILY
COMMUNITY
Start: 2022-02-18

## 2022-03-21 RX ORDER — TAMSULOSIN HYDROCHLORIDE 0.4 MG/1
0.4 CAPSULE ORAL
COMMUNITY
Start: 2022-03-04

## 2022-03-21 RX ORDER — HUMAN INSULIN 100 [IU]/ML
INJECTION, SUSPENSION SUBCUTANEOUS
COMMUNITY
Start: 2022-03-04

## 2022-03-21 RX ORDER — LANOLIN ALCOHOL/MO/W.PET/CERES
325 CREAM (GRAM) TOPICAL DAILY
COMMUNITY
Start: 2022-03-07

## 2022-03-21 RX ORDER — POTASSIUM CHLORIDE 20 MEQ/1
20 TABLET, EXTENDED RELEASE ORAL DAILY
COMMUNITY
Start: 2022-03-08

## 2022-03-21 RX ORDER — OMEPRAZOLE 20 MG/1
20 CAPSULE, DELAYED RELEASE ORAL 2 TIMES DAILY
COMMUNITY
Start: 2022-02-18

## 2022-03-21 NOTE — PROGRESS NOTES
Cardiovascular Specialists    Mr. Stefanie Sanz is 67 y.o. male with history of CAD: Diabetes, hypertension, hyperlipidemia    Patient is here today for cardiac evaluation. He denies any prior history of MI or CHF. Because of cardiac symptoms, patient underwent cardiac catheterization and 01/2022 which showed multivessel CAD. Patient eventually underwent CABG x5 with LIMA to LAD, SVG to RPDA, sequential SVG to diagonal 2 and ramus intermedius and mid obtuse marginal in 03/2022  Patient had postoperative atrial fibrillation and was started on amiodarone and anticoagulation    Patient denies any cardiac symptoms. He is feeling much better. Denies any symptoms to suggest angina or heart failure. Denies any nausea, vomiting, abdominal pain, fever, chills, sputum production. No hematuria or other bleeding complaints    Past Medical History:   Diagnosis Date    Diabetes ([facility] [address]     HLD (hyperlipidemia)     Hypertension     S/P CABG x 5 03/2022    LIMA to LAD, SVG to RPDA, sequential SVG to diagonal 2 and ramus intermedius and mid obtuse marginal    Thyroid disease        Review of Systems:  Cardiac symptoms as noted above in HPI. All others negative. Denies fatigue, malaise, skin rash, joint pain, blurring vision, photophobia, neck pain, hemoptysis, chronic cough, nausea, vomiting, hematuria, burning micturition, BRBPR, chronic headaches. Current Outpatient Medications   Medication Sig    bumetanide (BUMEX) 1 mg tablet Take 1 mg by mouth two (2) times a day.  ferrous sulfate 325 mg (65 mg iron) tablet Take 325 mg by mouth daily.  insulin nph-regular human rec (NovoLIN 70-30 FlexPen U-100) 100 unit/mL (70-30) inpn Inject 15 units before breakfast and 10 units before supper    potassium chloride (K-DUR, KLOR-CON M20) 20 mEq tablet Take 20 mEq by mouth daily.  omeprazole (PRILOSEC) 20 mg capsule Take 20 mg by mouth two (2) times a day.     tamsulosin (FLOMAX) 0.4 mg capsule Take 0.4 mg by mouth.  atorvastatin (LIPITOR) 80 mg tablet Take 80 mg by mouth daily.  warfarin (Coumadin) 2 mg tablet Take 1 tablet daily or as directed by physician.  amiodarone (CORDARONE) 200 mg tablet Take 200 mg by mouth.  nitroglycerin (NITROSTAT) 0.4 mg SL tablet 1 Tablet by SubLINGual route every five (5) minutes as needed for Chest Pain. Up to 3 doses.  carvediloL (COREG) 3.125 mg tablet Take 1 Tablet by mouth two (2) times daily (with meals). Indications: high blood pressure    aspirin delayed-release 81 mg tablet Take 1 Tablet by mouth daily.  isosorbide mononitrate ER (IMDUR) 30 mg tablet Take 1 Tablet by mouth every morning. No current facility-administered medications for this visit. Past Surgical History:   Procedure Laterality Date    COLONOSCOPY N/A 3/18/2019    COLONOSCOPY performed by Jeison Prado MD at [facility] ENDOSCOPY    COLONOSCOPY N/A 5/3/2016    COLONOSCOPY with hot snare polypectomy and cold forceps polypectomy performed by Lashawn Lucio MD at [facility] ENDOSCOPY    HX GI      hernia repair and colonoscopy       Allergies and Sensitivities:  Allergies   Allergen Reactions    Caffeine Anaphylaxis       Family History:  No family history on file. Social History:  Social History     Tobacco Use    Smoking status: Former Smoker    Smokeless tobacco: Never Used   Substance Use Topics    Alcohol use: Yes     Alcohol/week: 3.0 standard drinks     Types: 3 Shots of liquor per week     Comment: socially    Drug use: No     He  reports that he has quit smoking. He has never used smokeless tobacco.  He  reports current alcohol use of about 3.0 standard drinks of alcohol per week.     Physical Exam:  BP Readings from Last 3 Encounters:   03/21/22 94/60   02/14/22 108/60   02/07/22 128/80         Pulse Readings from Last 3 Encounters:   03/21/22 90   02/14/22 96   02/07/22 92          Wt Readings from Last 3 Encounters:   03/21/22 93.4 kg (206 lb)   22 97.5 kg (215 lb)   22 97.1 kg (214 lb)       Constitutional: Oriented to person, place, and time. HENT: Head: Normocephalic and atraumatic. Neck: No JVD present. Carotid bruit is not appreciated. Cardiovascular: Regular rhythm. No murmur, gallop or rubs appreciated  Lung: Breath sounds normal. No respiratory distress. No ronchi or rales appreciated  Abdominal: No tenderness. No rebound and no guarding. Musculoskeletal: There is 1+ lower extremity edema. No cynosis    LABS:   @  Lab Results   Component Value Date/Time    WBC 8.0 2022 12:13 PM    HGB 15.0 2022 12:13 PM    HCT 45.3 2022 12:13 PM    PLATELET 324  12:13 PM    MCV 97.0 2022 12:13 PM     Lab Results   Component Value Date/Time    Sodium 138 2022 12:13 PM    Potassium 4.0 2022 12:13 PM    Chloride 101 2022 12:13 PM    CO2 31 2022 12:13 PM    Glucose 174 (H) 2022 12:13 PM    BUN 13 2022 12:13 PM    Creatinine 0.95 2022 12:13 PM     No flowsheet data found. Lab Results   Component Value Date/Time    ALT (SGPT) 37 2022 12:13 PM     No results found for: HBA1C, MEN4LFSC, VYZ6IEEF, RJX4XAMC  No results found for: TSH, TSH2, TSH3, TSHP, TSHEXT, TSHEXT    EK/10/2022: Sinus rhythm at 90 bpm.  Poor R wave progression. PVC noted. NUCLEAR CARDIAC STRESS TEST 2022  Interpretation Summary    Nuclear Findings: LVEF measures 40%. TID ratio is 1.15.    Nuclear Findings: LV perfusion is abnormal. There is evidence of inducible ischemia.   Nuclear Findings: There is a moderate severity left ventricular stress perfusion defect that is medium in size present in the basal to mid inferolateral segment(s) that is partially reversible. The defect is consistent with abnormal perfusion in the LCx territory. Viability in the area is moderate. There is abnormal wall motion in the defect area.  The defect appears to be probable ischemia.   Nuclear Findings: Abnormal left ventricular systolic function post-stress. Global function is mildly reduced. Single regional abnormality during stress. LVEF measures 40%.   Nuclear Findings: The study is most consistent with myocardial ischemia. Findings suggest a moderate risk of myocardial ischemia. ECHO ADULT COMPLETE 01/11/2022 1/11/2022  Interpretation Summary    Left Ventricle: Left ventricle size is normal. Moderately increased wall thickness. Global hypokinesis present. Moderately reduced left ventricular systolic function with a visually estimated EF of 40 - 45%. Grade II diastolic dysfunction with increased LAP.   Mitral Valve: Mildly thickened leaflets.   Left Atrium: Left atrium is dilated. Left atrial volume index is mildly increased (35-41 mL/m2). CARDIAC Cath (01/2022)  Conclusion  LM: Distal 10%   LAD: Proximal calcified 60 to 70% eccentric stenosis followed by mid vessel 75% tubular stenosis  Ramus/high OM: Mid vessel 70% stenosis  LCx: Mid LCx extending into ostial OM eccentric calcified 80% stenosis, mid OM 70% stenosis  RCA: Ostial and proximal 100% occlusion with collaterals supplying large caliber distal vessel  Mildly reduced LVEF at 45-50%  LVEDP of 16 mmHg  We will refer patient for CABG evaluation especially with diabetes and mild LV dysfunction  Aggressive medical    IMPRESSION & PLAN:  Mr. Radha August is a 67 y.o. with multiple medical problem    CAD:  Cardiac catheterization in 01/2022 showed three-vessel disease as mentioned above  CABG X 5 in 03/2022 ( LIMA to LAD, SVG to RPDA, sequential SVG to diagonal 2 and ramus intermedius and mid obtuse marginal)  Continue aspirin, beta-blocker, statin  Currently without any angina  Refer patient to cardiac rehab    Atrial fibrillation:  Patient had postoperative post CABG atrial fibrillation in 03/2022  Patient was started on Coumadin and amiodarone in 03/2022. On exam he appears to be in sinus rhythm.   Patient did not have atrial fibrillation before. I will place event monitor. If no atrial fibrillation, plan is to consider slowly taper off and then discontinue anticoagulation as well as amiodarone. Continue beta-blocker at this time  Patient would like to stop Coumadin as that a lot of dietary restriction and frequent check. I will stop Coumadin and start patient on Xarelto 20 mg daily. Side effect discussed. Ischemic cardiomyopathy:  LVEF 40-45% echocardiogram and cardiac catheterization in 01/2022. S/p CABG x5 in 03/2022. Currently on Bumex, Coreg, Imdur. Will consider repeating echocardiogram in 3 months. If no improvement, will need to consider patient for ACE inhibitor/ARB or Entresto    Hypertension:  BP 94/60 continue current medication. Diabetes:  Goal hemoglobin A1c less than 7 is recommended from cardiovascular standpoint. Hyperlipidemia: This is being managed by PCP. Currently on atorvastatin 80 mg daily. Continue same    Importance of diet and exercise was discussed with patient. This plan was discussed with patient who is in agreement. Thank you for allowing me to participate in patient care. Please feel free to call me if you have any question or concern. Bobby Benton MD  Please note: This document has been produced using voice recognition software. Unrecognized errors in transcription may be present.

## 2022-03-21 NOTE — Clinical Note
3/21/2022    Patient: Vanita Salvador   YOB: 1945   Date of Visit: 3/21/2022     Nyasia Alvarez MD  Via     Dear Nyasia Alvarez MD,      Thank you for referring Mr. Payal Guzmán to [address] [place] [address] for evaluation. My notes for this consultation are attached. If you have questions, please do not hesitate to call me. I look forward to following your patient along with you.       Sincerely,    Corinna Barnes MD

## 2022-03-30 RX ORDER — AMIODARONE HYDROCHLORIDE 200 MG/1
200 TABLET ORAL DAILY
Qty: 90 TABLET | Refills: 1 | Status: SHIPPED | OUTPATIENT
Start: 2022-03-30 | End: 2022-05-16 | Stop reason: ALTCHOICE

## 2022-04-06 ENCOUNTER — HOSPITAL ENCOUNTER (OUTPATIENT)
Dept: CARDIAC REHAB | Age: 77
Discharge: HOME | End: 2022-04-06
Payer: MEDICARE

## 2022-04-06 VITALS — BODY MASS INDEX: 34.28 KG/M2 | WEIGHT: 206 LBS

## 2022-04-06 LAB — GLUCOSE BLD STRIP.AUTO-MCNC: 113 MG/DL (ref 70–110)

## 2022-04-06 PROCEDURE — 93798 PHYS/QHP OP CAR RHAB W/ECG: CPT

## 2022-04-06 NOTE — PROGRESS NOTES
CARDIAC REHAB INITIAL ITP FOR REVIEW AND SIGNATURE    Barrett Eaton 65 y.o. presented to cardiac rehab for an intake and a six minute walk test today with a primary diagnosis of CABG. Patient's EF is 40-45%. Barrett Eaton has a history of HTN, CAD, DM2;hyperlipidemia. Cardiac risk factors were reviewed with patient and spouse. Barrett Eaton is  and lives with lives with their spouse. PHQ-9, depression score, is 0 and this is considered to be normal. The result was discussed with patient who confirms score to be accurate and if above a 5, a copy of the results were sent to patient's PCP. Patient denied chest pain or SOB during 6 minute walk and the cardiac rhythm was in Normal Sinus Rhythm     Barrett Eaton will attend exercise and educational sessions 3 days a week in cardiac rehab for 36 sessions. Goals for Rehab:    Patient name: Barrett Eaton : 1945         Goals Comments   1. Patient would like to lose 5 lbs by next recert   [x] initial  [] met                  [] not met  [] progressing  Patient will make an appointment with Dietician by next cert. 2. Patient will increase his endurance by next recert. [x] initial  [] met                  [] not met  [] progressing Patient will increase his METs from 1.8 to 2.3   3. Patient will lower U7F  By next recert  [x] initial  [] met                  [] not met  [] progressing Patient will have lab draw by next recert.        Renea Tuttle RN 2022 4:16 PM      Cardiac ITP     CR INTAKE from 2022 in [place] [address]   Treatment Diagnosis    Treatment Diagnosis 1 CABG   CABG Date 22   Referral Date 22   Co-morbidities Diabetes   Individual Treatment Plan    ITP Visit Type Initial Assessment   1st Date of Exercise  22   ITP Next Review Date 22   Visit #/Total Visits    EF % 40 %   Risk Stratification Moderate   ITP Exercise, Psychosocial, Tobacco, Nutrition, Education   Exercise     Stages of Change Preparation   DASI Total Score 24.2   Assisted Devices None   Total Score 0   Test Six minute walk test   Exercise Prescription    Mode Treadmill, Bike, Stepper, Ergometer, Rower   Frequency per week 2-3   Duration per session 35-55   Intensity  METS       1.8   RPE 11-13   Target Heart Rate 85-99   Resistance Training Yes   Exercise Blood Pressures    Resting /71   Peak /68   Is BP WDL? Yes   Exercise Activity at Home    Type walking   Frequency daily x2   Duration 20-25   Resistance Training Yes   Exercise Education    Education Exercise safety, RPE scale, Signs/Symptoms to report, Equipment orientation   Exercise Target Goal    Target Goal(s) BP < 140/90 or < 130/80, if DM or CKD, Individual exercise RX, Aerobic activity 30 + minutes/day  5 days/week   Psychosocial    Stages of Change Maintenance   [facility] Total Score 15   PHQ 9 Score 0   Psychosocial Intervention    Interventions No intervention indicated   Psychosocial Education    Education Benefits of CPR completion, Cardiac meds   Psychosocial Target Goals    Nutrition    Stages of Change Preparation   Diabetes Yes   HbA1C 9.1   HbA1C Date 02/14/22   Lipids    Date Lipids Drawn --  [No current lipid panel]   Weight Management    Weight  93.4 kg (206 lb)   Height  5' 5\" (1.651 m)   BMI 34.35   Waist Circumference  46.5   Alcohol None   Rate Your Plate Total Score 52   Nutrition Intervention    Dietitian Consult Yes   Nurse/Patient Discussion Yes   Diabetes Education Referral Yes   Nutrition Education    Education Low sodium diet, Healthy eating, Special diet   Nutrition Target Goals    Education    Learning Barrier Ready to learn   Education Intervention    Education Schedule Given Yes   Patient Education     Education Risk factors, Med Compliance   Hypertension Yes   Hypertension Controlled Yes   Is BP WDL?  Yes   BP Meds --  [amiodarone,  Coreg, nitro, imdur]   Education Target Goals    Target Goals Medication compliance, Risk factors, Understand target guidelines for lipids, Understand target guidelines for B/P   Physician Response        Exercise     CR INTAKE from 4/6/2022 in [address] Common Questions    Any problems changes since your last visit Denies   Any symptoms while exercising Denies   Psychosocial/Stress Level 0   Patient Reported Glucose 113   Tobacco Use None   ITP Next Review Date 05/04/22   Visit Number/Total Visits 1/36   Exercise Treatment Log    Target Heart Rate(Range) 85-99   Resting HR 78   Resting /71   Recovery /78   Weight 93.4 kg (206 lb)   Peak RPE 16

## 2022-04-08 ENCOUNTER — HOSPITAL ENCOUNTER (OUTPATIENT)
Dept: CARDIAC REHAB | Age: 77
Discharge: HOME | End: 2022-04-08
Payer: MEDICARE

## 2022-04-08 VITALS — WEIGHT: 209 LBS | BODY MASS INDEX: 34.78 KG/M2

## 2022-04-08 PROCEDURE — 93798 PHYS/QHP OP CAR RHAB W/ECG: CPT

## 2022-04-11 ENCOUNTER — HOSPITAL ENCOUNTER (OUTPATIENT)
Dept: CARDIAC REHAB | Age: 77
Discharge: HOME | End: 2022-04-11
Payer: MEDICARE

## 2022-04-11 VITALS — BODY MASS INDEX: 34.45 KG/M2 | WEIGHT: 207 LBS

## 2022-04-11 PROCEDURE — 93798 PHYS/QHP OP CAR RHAB W/ECG: CPT

## 2022-04-13 ENCOUNTER — HOSPITAL ENCOUNTER (OUTPATIENT)
Dept: CARDIAC REHAB | Age: 77
Discharge: HOME | End: 2022-04-13
Payer: MEDICARE

## 2022-04-13 VITALS — BODY MASS INDEX: 34.78 KG/M2 | WEIGHT: 209 LBS

## 2022-04-13 PROCEDURE — 93798 PHYS/QHP OP CAR RHAB W/ECG: CPT

## 2022-04-14 ENCOUNTER — HOSPITAL ENCOUNTER (OUTPATIENT)
Dept: CARDIAC REHAB | Age: 77
Discharge: HOME | End: 2022-04-14
Payer: MEDICARE

## 2022-04-14 VITALS — WEIGHT: 206 LBS | BODY MASS INDEX: 34.28 KG/M2

## 2022-04-14 PROCEDURE — 93798 PHYS/QHP OP CAR RHAB W/ECG: CPT

## 2022-04-19 ENCOUNTER — HOSPITAL ENCOUNTER (OUTPATIENT)
Dept: CARDIAC REHAB | Age: 77
Discharge: HOME | End: 2022-04-19
Payer: MEDICARE

## 2022-04-19 VITALS — BODY MASS INDEX: 34.95 KG/M2 | WEIGHT: 210 LBS

## 2022-04-19 PROCEDURE — 93798 PHYS/QHP OP CAR RHAB W/ECG: CPT

## 2022-04-20 ENCOUNTER — HOSPITAL ENCOUNTER (OUTPATIENT)
Dept: CARDIAC REHAB | Age: 77
Discharge: HOME | End: 2022-04-20
Payer: MEDICARE

## 2022-04-20 VITALS — WEIGHT: 211 LBS | BODY MASS INDEX: 35.11 KG/M2

## 2022-04-20 PROCEDURE — 93798 PHYS/QHP OP CAR RHAB W/ECG: CPT

## 2022-04-22 ENCOUNTER — HOSPITAL ENCOUNTER (OUTPATIENT)
Dept: CARDIAC REHAB | Age: 77
Discharge: HOME | End: 2022-04-22
Payer: MEDICARE

## 2022-04-22 VITALS — BODY MASS INDEX: 35.11 KG/M2 | WEIGHT: 211 LBS

## 2022-04-22 PROCEDURE — 93798 PHYS/QHP OP CAR RHAB W/ECG: CPT

## 2022-04-25 ENCOUNTER — HOSPITAL ENCOUNTER (OUTPATIENT)
Dept: CARDIAC REHAB | Age: 77
Discharge: HOME | End: 2022-04-25
Payer: MEDICARE

## 2022-04-25 VITALS — WEIGHT: 210 LBS | BODY MASS INDEX: 34.95 KG/M2

## 2022-04-25 PROCEDURE — 93798 PHYS/QHP OP CAR RHAB W/ECG: CPT

## 2022-04-27 ENCOUNTER — HOSPITAL ENCOUNTER (OUTPATIENT)
Dept: CARDIAC REHAB | Age: 77
Discharge: HOME | End: 2022-04-27
Payer: MEDICARE

## 2022-04-27 VITALS — BODY MASS INDEX: 35.28 KG/M2 | WEIGHT: 212 LBS

## 2022-04-27 PROCEDURE — 93798 PHYS/QHP OP CAR RHAB W/ECG: CPT

## 2022-04-29 ENCOUNTER — HOSPITAL ENCOUNTER (OUTPATIENT)
Dept: CARDIAC REHAB | Age: 77
Discharge: HOME | End: 2022-04-29
Payer: MEDICARE

## 2022-04-29 VITALS — BODY MASS INDEX: 35.11 KG/M2 | WEIGHT: 211 LBS

## 2022-04-29 PROCEDURE — 93798 PHYS/QHP OP CAR RHAB W/ECG: CPT

## 2022-05-02 ENCOUNTER — HOSPITAL ENCOUNTER (OUTPATIENT)
Dept: CARDIAC REHAB | Age: 77
Discharge: HOME | End: 2022-05-02
Payer: MEDICARE

## 2022-05-02 LAB
GLUCOSE BLD STRIP.AUTO-MCNC: 55 MG/DL (ref 70–110)
GLUCOSE BLD STRIP.AUTO-MCNC: 55 MG/DL (ref 70–110)
GLUCOSE BLD STRIP.AUTO-MCNC: 70 MG/DL (ref 70–110)
GLUCOSE BLD STRIP.AUTO-MCNC: 80 MG/DL (ref 70–110)

## 2022-05-02 PROCEDURE — 93798 PHYS/QHP OP CAR RHAB W/ECG: CPT

## 2022-05-02 NOTE — PROGRESS NOTES
Pt. came into Cardiac Rehab for his 12th visit today and was unable to complete his exercise due to low BG. His vitals: Pre BP: 130/79; pt. Reported B before breakfast. He started on the TM and after 15 minutes he stated he had a \"cold sweat\" starting. BP taken 135/89 B Immediate repeat B. Orange juice given and 10 minutes later B. One more Orange juice given and 10 minutes later B. Pt. [place] better and left clinic to go home.

## 2022-05-04 ENCOUNTER — HOSPITAL ENCOUNTER (OUTPATIENT)
Dept: CARDIAC REHAB | Age: 77
Discharge: HOME | End: 2022-05-04
Payer: MEDICARE

## 2022-05-04 VITALS — BODY MASS INDEX: 35.11 KG/M2 | WEIGHT: 211 LBS

## 2022-05-04 PROCEDURE — 93798 PHYS/QHP OP CAR RHAB W/ECG: CPT

## 2022-05-04 NOTE — PROGRESS NOTES
CARDIAC REHAB ITP REASSESSMENT FOR REVIEW AND SIGNATURE  Patient name: Marisela Ascencio : 1945     Visits from Start of Care:       Reporting Period:   to 2022    Subjective Reports: no complaints, progressing well    Goals Comments   1. Patient would like to lose 5 lbs by next recert   [] met                  [] not met  [x] progressing Patient has gained 5 lbs since starting program. Will continue to stress heart healthy eating    2. Patient will increase his endurance by next recert. [] met                  [] not met  [x] progressing Patient is doing well on the exercise equipment and able to exercise longer and at an increased speed since initial visit. 3. Patient will lower Y6S by next recert   [] met                  [] not met  [x] progressing Await lab draw     Key functional changes: increase in endurance and stamina    Problems/ barriers to goal attainment: Today, patient discussed his progress, but asked about putting sessions on hold for the summer. He has a family summer home out of state. Would resume in .     Assessment / Recommendations: Continue w/ rehab    Sury Barron RN 2022 2:45 PM    Cardiac ITP     CR PHASE II from 2022 in [facility]   Treatment Diagnosis    Treatment Diagnosis 1 CABG   CABG Date 22   Referral Date 22   Co-morbidities Diabetes   Individual Treatment Plan    ITP Visit Type Re-Assessment   1st Date of Exercise  22   ITP Next Review Date 22   Visit #/Total Visits    EF % 40 %   Risk Stratification Moderate   ITP Exercise, Psychosocial, Tobacco, Nutrition, Education   Exercise     Stages of Change Preparation, Action   Assisted Devices None   Test Six minute walk test   Exercise Prescription    Mode Treadmill, Bike, Stepper, Ergometer, Rower   Frequency per week 2-3   Duration per session 35-55   Intensity  METS       1.8   RPE 11-13   Target Heart Rate 85-99   Resistance Training Yes   Exercise Blood Pressures    Resting /70   Peak /68   Is BP WDL? No   Exercise Activity at Home    Type walking   Frequency daily x2   Duration 20-25   Resistance Training Yes   Exercise Education    Education Exercise safety, RPE scale, Signs/Symptoms to report, Equipment orientation   Exercise Target Goal    Target Goal(s) BP < 140/90 or < 130/80, if DM or CKD, Individual exercise RX, Aerobic activity 30 + minutes/day  5 days/week   Psychosocial    Stages of Change Maintenance   Psychosocial Intervention    Interventions No intervention indicated   Psychosocial Education    Education Benefits of CPR completion, Cardiac meds   Psychosocial Target Goals    Nutrition    Stages of Change Action   Diabetes Yes   HbA1C 9.1   Lipids    Date Lipids Drawn --  [No current lipid panel]   Weight Management    Weight  95.7 kg (211 lb)   Height  5' 5\" (1.651 m)   BMI 35.19   Waist Circumference  46.5   Alcohol None   Nutrition Intervention    Dietitian Consult Yes   Nurse/Patient Discussion Yes   Diabetes Education Referral Yes   Nutrition Education    Nutrition Target Goals    Education    Learning Barrier Ready to learn   Education Intervention    Education Schedule Given Yes   Patient Education     Education Risk factors, Med Compliance   Hypertension Yes   Hypertension Controlled Yes   Is BP WDL?  Yes   BP Meds --  [amiodarone,  Coreg, nitro, imdur]   Education Target Goals    Target Goals Medication compliance, Risk factors, Understand target guidelines for lipids, Understand target guidelines for B/P   Physician Response        Exercise     CR PHASE II from 5/4/2022 in [address] Common Questions    Any problems changes since your last visit Denies   Any symptoms while exercising denies   Psychosocial/Stress Level 0   Patient Reported Glucose 126   Resting EKG rhythm SR   Tobacco Use None   ITP Next Review Date 06/02/22   Visit Number/Total Visits 12/36   On Call Medical Director Immediately Available Dev [place] Exercise Treatment Log    Target Heart Rate(Range) 85-99   Resting HR 81   Resting /70   Recovery HR 77   Recovery /76   Weight 95.7 kg (211 lb)   Exercise EKG Rhythm SR   Exercise Duration 45   Peak HR 96   Peak RPE 15   Peak Mets 2.4   Asymptomatic yes   Total Minutes 55

## 2022-05-06 ENCOUNTER — HOSPITAL ENCOUNTER (OUTPATIENT)
Dept: CARDIAC REHAB | Age: 77
Discharge: HOME | End: 2022-05-06
Payer: MEDICARE

## 2022-05-06 VITALS — WEIGHT: 210 LBS | BODY MASS INDEX: 34.95 KG/M2

## 2022-05-06 PROCEDURE — 93798 PHYS/QHP OP CAR RHAB W/ECG: CPT

## 2022-05-09 ENCOUNTER — HOSPITAL ENCOUNTER (OUTPATIENT)
Dept: CARDIAC REHAB | Age: 77
Discharge: HOME | End: 2022-05-09
Payer: MEDICARE

## 2022-05-09 VITALS — WEIGHT: 212 LBS | BODY MASS INDEX: 35.28 KG/M2

## 2022-05-09 PROCEDURE — 93798 PHYS/QHP OP CAR RHAB W/ECG: CPT

## 2022-05-11 ENCOUNTER — HOSPITAL ENCOUNTER (OUTPATIENT)
Dept: CARDIAC REHAB | Age: 77
Discharge: HOME | End: 2022-05-11
Payer: MEDICARE

## 2022-05-11 VITALS — BODY MASS INDEX: 35.11 KG/M2 | WEIGHT: 211 LBS

## 2022-05-11 PROCEDURE — 93798 PHYS/QHP OP CAR RHAB W/ECG: CPT

## 2022-05-13 ENCOUNTER — HOSPITAL ENCOUNTER (OUTPATIENT)
Dept: CARDIAC REHAB | Age: 77
Discharge: HOME | End: 2022-05-13
Payer: MEDICARE

## 2022-05-13 VITALS — WEIGHT: 213 LBS | BODY MASS INDEX: 35.45 KG/M2

## 2022-05-13 PROCEDURE — 93798 PHYS/QHP OP CAR RHAB W/ECG: CPT

## 2022-05-13 NOTE — PROGRESS NOTES
CARDIAC REHAB DISCHARGE NOTE FOR REVIEW AND SIGNATURE      Non Routine Cardiac Rehab Discharge    Herve Drake  65 y.o. With diagnosis of CABG x 5 attended phase II cardiac rehab for 16 sessions from 4/6 to 5/13/2022.   Patient will be vacationing at his summer home out of state and will return to Cardiac Rehab in the fall    Lottie Angeles RN  5/13/2022

## 2022-05-16 ENCOUNTER — OFFICE VISIT (OUTPATIENT)
Dept: CARDIOLOGY CLINIC | Age: 77
End: 2022-05-16
Payer: MEDICARE

## 2022-05-16 VITALS
OXYGEN SATURATION: 98 % | SYSTOLIC BLOOD PRESSURE: 128 MMHG | BODY MASS INDEX: 34.99 KG/M2 | WEIGHT: 210 LBS | HEIGHT: 65 IN | HEART RATE: 74 BPM | DIASTOLIC BLOOD PRESSURE: 82 MMHG

## 2022-05-16 DIAGNOSIS — I48.91 ATRIAL FIBRILLATION, UNSPECIFIED TYPE (HCC): Primary | ICD-10-CM

## 2022-05-16 PROCEDURE — 1101F PT FALLS ASSESS-DOCD LE1/YR: CPT | Performed by: INTERNAL MEDICINE

## 2022-05-16 PROCEDURE — G8536 NO DOC ELDER MAL SCRN: HCPCS | Performed by: INTERNAL MEDICINE

## 2022-05-16 PROCEDURE — G8752 SYS BP LESS 140: HCPCS | Performed by: INTERNAL MEDICINE

## 2022-05-16 PROCEDURE — G8510 SCR DEP NEG, NO PLAN REQD: HCPCS | Performed by: INTERNAL MEDICINE

## 2022-05-16 PROCEDURE — G8428 CUR MEDS NOT DOCUMENT: HCPCS | Performed by: INTERNAL MEDICINE

## 2022-05-16 PROCEDURE — G8754 DIAS BP LESS 90: HCPCS | Performed by: INTERNAL MEDICINE

## 2022-05-16 PROCEDURE — G8417 CALC BMI ABV UP PARAM F/U: HCPCS | Performed by: INTERNAL MEDICINE

## 2022-05-16 PROCEDURE — 99214 OFFICE O/P EST MOD 30 MIN: CPT | Performed by: INTERNAL MEDICINE

## 2022-05-16 RX ORDER — BUMETANIDE 0.5 MG/1
0.5 TABLET ORAL AS NEEDED
Qty: 20 TABLET | Refills: 6 | Status: SHIPPED | OUTPATIENT
Start: 2022-05-16 | End: 2022-05-20 | Stop reason: SDUPTHER

## 2022-05-16 RX ORDER — METFORMIN HYDROCHLORIDE 500 MG/1
TABLET, EXTENDED RELEASE ORAL
COMMUNITY
Start: 2022-04-15

## 2022-05-16 RX ORDER — FENOFIBRATE 134 MG/1
134 CAPSULE ORAL DAILY
COMMUNITY
Start: 2022-04-11

## 2022-05-16 RX ORDER — LEVOTHYROXINE SODIUM 50 UG/1
50 TABLET ORAL DAILY
COMMUNITY
Start: 2022-03-17 | End: 2022-05-16

## 2022-05-16 RX ORDER — WARFARIN SODIUM 5 MG/1
TABLET ORAL
COMMUNITY
Start: 2022-04-15

## 2022-05-16 NOTE — PROGRESS NOTES
Daquan Jung presents today for   Chief Complaint   Patient presents with    Follow-up     3 months - no cardiac complaints        Daquan Jung preferred language for health care discussion is english/other. Is someone accompanying this pt? no    Is the patient using any DME equipment during [address]? no    Depression Screening:  3 most recent PHQ Screens 5/16/2022   Little interest or pleasure in doing things Not at all   Feeling down, depressed, irritable, or hopeless Not at all   Total Score PHQ 2 0   Trouble falling or staying asleep, or sleeping too much -   Feeling tired or having little energy -   Poor appetite, weight loss, or overeating -   Feeling bad about yourself - or that you are a failure or have let yourself or your family down -   Trouble concentrating on things such as school, work, reading, or watching TV -   Moving or speaking so slowly that other people could have noticed; or the opposite being so fidgety that others notice -   Thoughts of being better off dead, or hurting yourself in some way -   PHQ 9 Score -       Learning Assessment:  Learning Assessment 2/7/2022   PRIMARY LEARNER Patient   PRIMARY LANGUAGE ENGLISH   LEARNER PREFERENCE PRIMARY DEMONSTRATION   ANSWERED BY patient   RELATIONSHIP SELF       Abuse Screening:  Abuse Screening Questionnaire 2/7/2022   Do you ever feel afraid of your partner? N   Are you in a relationship with someone who physically or mentally threatens you? N   Is it safe for you to go home? Y       Fall Risk  Fall Risk Assessment, last 12 mths 5/16/2022   Able to walk? Yes   Fall in past 12 months? 0   Do you feel unsteady? 0   Are you worried about falling 0       Pt currently taking Anticoagulant therapy? ASA 81mg every day and Warfarin     Coordination of Care:  1. Have you been to the ER, urgent care clinic since your last visit? Hospitalized since your last visit? no    2.  Have you seen or consulted any other health care providers outside of the [facility] [address] since your last visit? Include any pap smears or colon screening.  no

## 2022-05-16 NOTE — Clinical Note
5/16/2022    Patient: Daquan Jung   YOB: 1945   Date of Visit: 5/16/2022     Lani Cuevas MD  Via     Dear Lani Cuevas MD,      Thank you for referring Mr. Dagoberto Sinclair to [address] [place] [address] for evaluation. My notes for this consultation are attached. If you have questions, please do not hesitate to call me. I look forward to following your patient along with you.       Sincerely,    Angie Ramachandran MD

## 2022-05-16 NOTE — PROGRESS NOTES
[facility]    Mr. Shahid Ortiz is 65 y.o. male with history of CAD: Diabetes, hypertension, hyperlipidemia    Patient is here today for cardiac evaluation. He denies any prior history of MI or CHF. Because of cardiac symptoms, patient underwent cardiac catheterization and 01/2022 which showed multivessel CAD. Patient eventually underwent CABG x5 with LIMA to LAD, SVG to RPDA, sequential SVG to diagonal 2 and ramus intermedius and mid obtuse marginal in 03/2022  Patient had postoperative atrial fibrillation and was started on amiodarone and anticoagulation    Patient denies any cardiac symptoms. He is feeling much better. He is going through cardiac rehabilitation program  Denies any symptoms to suggest angina or heart failure. Denies any nausea, vomiting, abdominal pain, fever, chills, sputum production. No hematuria or other bleeding complaints    Past Medical History:   Diagnosis Date    Diabetes ([facility] [address]     HLD (hyperlipidemia)     Hypertension     S/P CABG x 5 03/2022    LIMA to LAD, SVG to RPDA, sequential SVG to diagonal 2 and ramus intermedius and mid obtuse marginal    Thyroid disease        Review of Systems:  Cardiac symptoms as noted above in HPI. All others negative. Denies fatigue, malaise, skin rash, joint pain, blurring vision, photophobia, neck pain, hemoptysis, chronic cough, nausea, vomiting, hematuria, burning micturition, BRBPR, chronic headaches. Current Outpatient Medications   Medication Sig    fenofibrate micronized (LOFIBRA) 134 mg capsule Take 134 mg by mouth daily.  metFORMIN ER (GLUCOPHAGE XR) 500 mg tablet TAKE 1 TABLET BY MOUTH ONCE DAILY WITH FOOD    warfarin (COUMADIN) 5 mg tablet TAKE 3 TABLETS BY MOUTH DAY AFTER XARELTO THEN 1 TABLET DAILY    amiodarone (CORDARONE) 200 mg tablet Take 1 Tablet by mouth daily.  ferrous sulfate 325 mg (65 mg iron) tablet Take 325 mg by mouth daily.     insulin nph-regular human rec (NovoLIN 70-30 FlexPen U-100) 100 unit/mL (70-30) inpn Inject 15 units before breakfast and 10 units before supper    potassium chloride (K-DUR, KLOR-CON M20) 20 mEq tablet Take 20 mEq by mouth daily.  omeprazole (PRILOSEC) 20 mg capsule Take 20 mg by mouth two (2) times a day.  tamsulosin (FLOMAX) 0.4 mg capsule Take 0.4 mg by mouth.  atorvastatin (LIPITOR) 80 mg tablet Take 80 mg by mouth daily.  nitroglycerin (NITROSTAT) 0.4 mg SL tablet 1 Tablet by SubLINGual route every five (5) minutes as needed for Chest Pain. Up to 3 doses.  carvediloL (COREG) 3.125 mg tablet Take 1 Tablet by mouth two (2) times daily (with meals). Indications: high blood pressure    aspirin delayed-release 81 mg tablet Take 1 Tablet by mouth daily. No current facility-administered medications for this visit. Past Surgical History:   Procedure Laterality Date    COLONOSCOPY N/A 3/18/2019    COLONOSCOPY performed [facility] Cherelle Orellana [facility] at [facility] ENDOSCOPY    COLONOSCOPY N/A 5/3/2016    COLONOSCOPY with hot snare polypectomy and cold forceps polypectomy performed by Luis Stewart MD at [facility] ENDOSCOPY    HX GI      hernia repair and colonoscopy       Allergies and Sensitivities:  Allergies   Allergen Reactions    Caffeine Anaphylaxis       Family History:  No family history on file. Social History:  Social History     Tobacco Use    Smoking status: Former Smoker    Smokeless tobacco: Never Used   Substance Use Topics    Alcohol use: Yes     Alcohol/week: 3.0 standard drinks     Types: 3 Shots of liquor per week     Comment: socially    Drug use: No     He  reports that he has quit smoking. He has never used smokeless tobacco.  He  reports current alcohol use of about 3.0 standard drinks of alcohol per week.     Physical Exam:  BP Readings from Last 3 Encounters:   05/16/22 128/82   03/21/22 94/60   02/14/22 108/60         Pulse Readings from Last 3 Encounters:   05/16/22 74   03/21/22 90 22 96          Wt Readings from Last 3 Encounters:   22 95.3 kg (210 lb)   22 96.6 kg (213 lb)   22 95.7 kg (211 lb)       Constitutional: Oriented to person, place, and time. HENT: Head: Normocephalic and atraumatic. Neck: No JVD present. Carotid bruit is not appreciated. Cardiovascular: Regular rhythm. No murmur, gallop or rubs appreciated  Lung: Breath sounds normal. No respiratory distress. No ronchi or rales appreciated  Abdominal: No tenderness. No rebound and no guarding. Musculoskeletal: There is 1+ lower extremity edema. No cynosis    LABS:   @  Lab Results   Component Value Date/Time    WBC 8.0 2022 12:13 PM    HGB 15.0 2022 12:13 PM    HCT 45.3 2022 12:13 PM    PLATELET 720  12:13 PM    MCV 97.0 2022 12:13 PM     Lab Results   Component Value Date/Time    Sodium 138 2022 12:13 PM    Potassium 4.0 2022 12:13 PM    Chloride 101 2022 12:13 PM    CO2 31 2022 12:13 PM    Glucose 174 (H) 2022 12:13 PM    BUN 13 2022 12:13 PM    Creatinine 0.95 2022 12:13 PM     No flowsheet data found. Lab Results   Component Value Date/Time    ALT (SGPT) 37 2022 12:13 PM     No results found for: HBA1C, NUX4XJJP, TFD1YZRV, FRY5KOXV  No results found for: TSH, TSH2, TSH3, TSHP, TSHEXT, TSHEXT    EK/10/2022: Sinus rhythm at 90 bpm.  Poor R wave progression. PVC noted. NUCLEAR CARDIAC STRESS TEST 2022  Interpretation Summary    Nuclear Findings: LVEF measures 40%. TID ratio is 1.15.    Nuclear Findings: LV perfusion is abnormal. There is evidence of inducible ischemia.   Nuclear Findings: There is a moderate severity left ventricular stress perfusion defect that is medium in size present in the basal to mid inferolateral segment(s) that is partially reversible. The defect is consistent with abnormal perfusion in the LCx territory. Viability in the area is moderate.  There is abnormal wall motion in the defect area. The defect appears to be probable ischemia.   Nuclear Findings: Abnormal left ventricular systolic function post-stress. Global function is mildly reduced. Single regional abnormality during stress. LVEF measures 40%.   Nuclear Findings: The study is most consistent with myocardial ischemia. Findings suggest a moderate risk of myocardial ischemia. ECHO ADULT COMPLETE 01/11/2022 1/11/2022  Interpretation Summary    Left Ventricle: Left ventricle size is normal. Moderately increased wall thickness. Global hypokinesis present. Moderately reduced left ventricular systolic function with a visually estimated EF of 40 - 45%. Grade II diastolic dysfunction with increased LAP.   Mitral Valve: Mildly thickened leaflets.   Left Atrium: Left atrium is dilated. Left atrial volume index is mildly increased (35-41 mL/m2).     CARDIAC Cath (01/2022)  Conclusion  LM: Distal 10%   LAD: Proximal calcified 60 to 70% eccentric stenosis followed by mid vessel 75% tubular stenosis  Ramus/high OM: Mid vessel 70% stenosis  LCx: Mid LCx extending into ostial OM eccentric calcified 80% stenosis, mid OM 70% stenosis  RCA: Ostial and proximal 100% occlusion with collaterals supplying large caliber distal vessel  Mildly reduced LVEF at 45-50%  LVEDP of 16 mmHg  We will refer patient for CABG evaluation especially with diabetes and mild LV dysfunction  Aggressive medical    IMPRESSION & PLAN:  Mr. Del Khan is a 65 y.o. with multiple medical problem    CAD:  Cardiac catheterization in 01/2022 showed three-vessel disease as mentioned above  CABG X 5 in 03/2022 ( LIMA to LAD, SVG to RPDA, sequential SVG to diagonal 2 and ramus intermedius and mid obtuse marginal)  Continue aspirin, beta-blocker, statin  Currently without any angina  Is going through cardiac rehabilitation program    Atrial fibrillation:  Patient had postoperative post CABG atrial fibrillation in 03/2022  Patient was started on Coumadin and amiodarone in 03/2022. On exam he appears to be in sinus rhythm. Event monitor in 04/2022 for 30-day, sinus rhythm, no arrhythmia. No A. fib or pauses. Patient did not have atrial fibrillation before. Discussed with the patient that he does not have atrial fibrillation. He would like to stop medications. I will stop amiodarone first.  3 months after stopping amiodarone, I would like to repeat event monitor and if he is not in atrial fibrillation, can consider stopping Coumadin at the time    Ischemic cardiomyopathy:  LVEF 40-45% echocardiogram and cardiac catheterization in 01/2022. S/p CABG x5 in 03/2022. Currently on Bumex, Coreg, Imdur. Will consider repeating echocardiogram in 6 months. If no improvement, will need to consider patient for ACE inhibitor/ARB or Entresto    Hypertension:  /82 continue current medication. Diabetes:  Goal hemoglobin A1c less than 7 is recommended from cardiovascular standpoint. Hyperlipidemia: This is being managed by PCP. Currently on atorvastatin 80 mg daily. Continue same    This plan was discussed with patient who is in agreement. Thank you for allowing me to participate in patient care. Please feel free to call me if you have any question or concern. Quin Fleming MD  Please note: This document has been produced using voice recognition software. Unrecognized errors in transcription may be present.

## 2022-05-20 RX ORDER — BUMETANIDE 0.5 MG/1
0.5 TABLET ORAL
Qty: 20 TABLET | Refills: 6 | Status: SHIPPED | OUTPATIENT
Start: 2022-05-20

## 2022-05-26 ENCOUNTER — TELEPHONE (OUTPATIENT)
Dept: CARDIAC REHAB | Age: 77
End: 2022-05-26

## 2022-05-26 NOTE — TELEPHONE ENCOUNTER
5/26/2022 Cardiac Wellness: Called Mr. Mary Bowles to discuss virtual nutrition appointment. Mr. Mary Bowles is without questions or concerns. Him and his wife just started a new diet and he is already loosing weight, he has declined a virtual meeting with our dietician.  Monae Khan

## 2022-06-27 ENCOUNTER — HOSPITAL ENCOUNTER (EMERGENCY)
Age: 77
Discharge: HOME | End: 2022-06-27
Attending: STUDENT IN AN ORGANIZED HEALTH CARE EDUCATION/TRAINING PROGRAM
Payer: MEDICARE

## 2022-06-27 ENCOUNTER — APPOINTMENT (OUTPATIENT)
Dept: CT IMAGING | Age: 77
End: 2022-06-27
Attending: PHYSICIAN ASSISTANT
Payer: MEDICARE

## 2022-06-27 VITALS
OXYGEN SATURATION: 99 % | HEIGHT: 65 IN | WEIGHT: 202 LBS | DIASTOLIC BLOOD PRESSURE: 93 MMHG | RESPIRATION RATE: 16 BRPM | TEMPERATURE: 98 F | HEART RATE: 81 BPM | SYSTOLIC BLOOD PRESSURE: 166 MMHG | BODY MASS INDEX: 33.66 KG/M2

## 2022-06-27 DIAGNOSIS — T14.8XXA SUPERFICIAL HEMATOMA: Primary | ICD-10-CM

## 2022-06-27 LAB
ABO + RH BLD: NORMAL
ALBUMIN SERPL-MCNC: 3.4 G/DL (ref 3.4–5)
ALBUMIN/GLOB SERPL: 1 {RATIO} (ref 0.8–1.7)
ALP SERPL-CCNC: 68 U/L (ref 45–117)
ALT SERPL-CCNC: 23 U/L (ref 16–61)
ANION GAP SERPL CALC-SCNC: 3 MMOL/L (ref 3–18)
APPEARANCE UR: CLEAR
AST SERPL-CCNC: 18 U/L (ref 10–38)
BASOPHILS # BLD: 0.1 K/UL (ref 0–0.1)
BASOPHILS NFR BLD: 1 % (ref 0–2)
BILIRUB SERPL-MCNC: 0.9 MG/DL (ref 0.2–1)
BILIRUB UR QL: NEGATIVE
BLOOD GROUP ANTIBODIES SERPL: NORMAL
BUN SERPL-MCNC: 19 MG/DL (ref 7–18)
BUN/CREAT SERPL: 17 (ref 12–20)
CALCIUM SERPL-MCNC: 8.8 MG/DL (ref 8.5–10.1)
CHLORIDE SERPL-SCNC: 108 MMOL/L (ref 100–111)
CO2 SERPL-SCNC: 29 MMOL/L (ref 21–32)
COLOR UR: YELLOW
CREAT SERPL-MCNC: 1.1 MG/DL (ref 0.6–1.3)
DIFFERENTIAL METHOD BLD: ABNORMAL
EOSINOPHIL # BLD: 0.2 K/UL (ref 0–0.4)
EOSINOPHIL NFR BLD: 2 % (ref 0–5)
ERYTHROCYTE [DISTWIDTH] IN BLOOD BY AUTOMATED COUNT: 17 % (ref 11.6–14.5)
GLOBULIN SER CALC-MCNC: 3.3 G/DL (ref 2–4)
GLUCOSE SERPL-MCNC: 114 MG/DL (ref 74–99)
GLUCOSE UR STRIP.AUTO-MCNC: NEGATIVE MG/DL
HCT VFR BLD AUTO: 38.8 % (ref 36–48)
HCT VFR BLD AUTO: 41.8 % (ref 36–48)
HGB BLD-MCNC: 12.7 G/DL (ref 13–16)
HGB BLD-MCNC: 13.5 G/DL (ref 13–16)
HGB UR QL STRIP: NEGATIVE
IMM GRANULOCYTES # BLD AUTO: 0.1 K/UL (ref 0–0.04)
IMM GRANULOCYTES NFR BLD AUTO: 1 % (ref 0–0.5)
INR PPP: 1.4 (ref 0.8–1.2)
KETONES UR QL STRIP.AUTO: NEGATIVE MG/DL
LEUKOCYTE ESTERASE UR QL STRIP.AUTO: NEGATIVE
LYMPHOCYTES # BLD: 1.2 K/UL (ref 0.9–3.6)
LYMPHOCYTES NFR BLD: 18 % (ref 21–52)
MCH RBC QN AUTO: 30.9 PG (ref 24–34)
MCHC RBC AUTO-ENTMCNC: 32.7 G/DL (ref 31–37)
MCV RBC AUTO: 94.4 FL (ref 78–100)
MONOCYTES # BLD: 0.8 K/UL (ref 0.05–1.2)
MONOCYTES NFR BLD: 12 % (ref 3–10)
NEUTS SEG # BLD: 4.4 K/UL (ref 1.8–8)
NEUTS SEG NFR BLD: 66 % (ref 40–73)
NITRITE UR QL STRIP.AUTO: NEGATIVE
NRBC # BLD: 0 K/UL (ref 0–0.01)
NRBC BLD-RTO: 0 PER 100 WBC
PH UR STRIP: 7.5 [PH] (ref 5–8)
PLATELET # BLD AUTO: 277 K/UL (ref 135–420)
PMV BLD AUTO: 10.7 FL (ref 9.2–11.8)
POTASSIUM SERPL-SCNC: 4.3 MMOL/L (ref 3.5–5.5)
PROT SERPL-MCNC: 6.7 G/DL (ref 6.4–8.2)
PROT UR STRIP-MCNC: NEGATIVE MG/DL
PROTHROMBIN TIME: 17.1 SEC (ref 11.5–15.2)
RBC # BLD AUTO: 4.11 M/UL (ref 4.35–5.65)
SODIUM SERPL-SCNC: 140 MMOL/L (ref 136–145)
SP GR UR REFRACTOMETRY: 1.01 (ref 1–1.03)
SPECIMEN EXP DATE BLD: NORMAL
UROBILINOGEN UR QL STRIP.AUTO: 2 EU/DL (ref 0.2–1)
WBC # BLD AUTO: 6.7 K/UL (ref 4.6–13.2)

## 2022-06-27 PROCEDURE — 99285 EMERGENCY DEPT VISIT HI MDM: CPT

## 2022-06-27 PROCEDURE — 85018 HEMOGLOBIN: CPT

## 2022-06-27 PROCEDURE — 80053 COMPREHEN METABOLIC PANEL: CPT

## 2022-06-27 PROCEDURE — 74178 CT ABD&PLV WO CNTR FLWD CNTR: CPT

## 2022-06-27 PROCEDURE — 86900 BLOOD TYPING SEROLOGIC ABO: CPT

## 2022-06-27 PROCEDURE — 74011000636 HC RX REV CODE- 636: Performed by: STUDENT IN AN ORGANIZED HEALTH CARE EDUCATION/TRAINING PROGRAM

## 2022-06-27 PROCEDURE — 85610 PROTHROMBIN TIME: CPT

## 2022-06-27 PROCEDURE — 85025 COMPLETE CBC W/AUTO DIFF WBC: CPT

## 2022-06-27 PROCEDURE — 81003 URINALYSIS AUTO W/O SCOPE: CPT

## 2022-06-27 RX ADMIN — IOPAMIDOL 100 ML: 755 INJECTION, SOLUTION INTRAVENOUS at 10:25

## 2022-06-27 NOTE — ED PROVIDER NOTES
EMERGENCY DEPARTMENT HISTORY AND PHYSICAL EXAM      Date: 6/27/2022  Patient Name: Martín Aggarwal    History of Presenting Illness     Chief Complaint   Patient presents with    Bleeding/Bruising       History Provided By: Patient    HPI: Martín Aggarwal, 65 y.o. male PMHx significant for htn, thyroid disease, DM, hyperlipidemia, CABG presents ambulatory to the ED. Pt currently taking warfarin for previous CABG. Pt reports noticing atraumatic bruising to LLQ last night around 2300. Denies known trauma or injury. Denies pain. Denies hematuria. Patient reports he regularly checks his INR. Patient reports he forgot to take his warfarin last night. Denies other complaints at this time. There are no other complaints, changes, or physical findings at this time. PCP: Miguel Paez MD    No current facility-administered medications on file prior to encounter. Current Outpatient Medications on File Prior to Encounter   Medication Sig Dispense Refill    bumetanide (BUMEX) 0.5 mg tablet Take 1 Tablet by mouth daily as needed (swelling). 20 Tablet 6    fenofibrate micronized (LOFIBRA) 134 mg capsule Take 134 mg by mouth daily.  metFORMIN ER (GLUCOPHAGE XR) 500 mg tablet TAKE 1 TABLET BY MOUTH ONCE DAILY WITH FOOD      warfarin (COUMADIN) 5 mg tablet TAKE 3 TABLETS BY MOUTH DAY AFTER XARELTO THEN 1 TABLET DAILY      ferrous sulfate 325 mg (65 mg iron) tablet Take 325 mg by mouth daily.  insulin nph-regular human rec (NovoLIN 70-30 FlexPen U-100) 100 unit/mL (70-30) inpn Inject 15 units before breakfast and 10 units before supper      potassium chloride (K-DUR, KLOR-CON M20) 20 mEq tablet Take 20 mEq by mouth daily.  omeprazole (PRILOSEC) 20 mg capsule Take 20 mg by mouth two (2) times a day.  tamsulosin (FLOMAX) 0.4 mg capsule Take 0.4 mg by mouth.  atorvastatin (LIPITOR) 80 mg tablet Take 80 mg by mouth daily.       nitroglycerin (NITROSTAT) 0.4 mg SL tablet 1 Tablet by SubLINGual route every five (5) minutes as needed for Chest Pain. Up to 3 doses. 25 Tablet 3    carvediloL (COREG) 3.125 mg tablet Take 1 Tablet by mouth two (2) times daily (with meals). Indications: high blood pressure 60 Tablet 5    aspirin delayed-release 81 mg tablet Take 1 Tablet by mouth daily. 30 Tablet 5       Past History     Past Medical History:  Past Medical History:   Diagnosis Date    Diabetes ([facility] [address]     HLD (hyperlipidemia)     Hypertension     S/P CABG x 5 03/2022    LIMA to LAD, SVG to RPDA, sequential SVG to diagonal 2 and ramus intermedius and mid obtuse marginal    Thyroid disease        Past Surgical History:  Past Surgical History:   Procedure Laterality Date    COLONOSCOPY N/A 3/18/2019    COLONOSCOPY performed by Catherine Martinez MD at [facility] ENDOSCOPY    COLONOSCOPY N/A 5/3/2016    COLONOSCOPY with hot snare polypectomy and cold forceps [facility]pect[facility]      hernia repair and colonoscopy       Family History:  History reviewed. No pertinent family history. Social History:  Social History     Tobacco Use    Smoking status: Former Smoker    Smokeless tobacco: Never Used   Substance Use Topics    Alcohol use: Yes     Alcohol/week: 3.0 standard drinks     Types: 3 Shots of liquor per week     Comment: socially    Drug use: No       Allergies: Allergies   Allergen Reactions    Caffeine Anaphylaxis         Review of Systems   Review of Systems   Constitutional: Negative for chills and fever. HENT: Negative for facial swelling. Eyes: Negative for photophobia and visual disturbance. Respiratory: Negative for shortness of breath. Cardiovascular: Negative for chest pain. Gastrointestinal: Negative for abdominal pain, nausea and vomiting. Genitourinary: Negative for flank pain. Skin: Negative for color change, pallor, rash and wound.         Bruising to LLQ   Neurological: Negative for dizziness, weakness, light-headedness and headaches. All other systems reviewed and are negative. Physical Exam   Physical Exam  Vitals and nursing note reviewed. Constitutional:       General: He is not in acute distress. Appearance: He is well-developed. Comments: Pt in NAD   HENT:      Head: Normocephalic and atraumatic. Eyes:      Conjunctiva/sclera: Conjunctivae normal.   Cardiovascular:      Rate and Rhythm: Normal rate and regular rhythm. Heart sounds: Normal heart sounds. Pulmonary:      Effort: Pulmonary effort is normal. No respiratory distress. Breath sounds: Normal breath sounds. Abdominal:      General: Bowel sounds are normal.      Palpations: Abdomen is soft. Tenderness: There is no abdominal tenderness. Comments: Abdomen soft, nontender  Nondistended  No guarding or rigidity     Musculoskeletal:         General: Normal range of motion. Skin:     General: Skin is warm. Findings: No rash. Neurological:      Mental Status: He is alert and oriented to person, place, and time. Cranial Nerves: No cranial nerve deficit. Psychiatric:         Behavior: Behavior normal.         Diagnostic Study Results     Labs -   No results found for this or any previous visit (from the past 12 hour(s)). Radiologic Studies -   CT ABD PELV W WO CONT   Final Result   1. Small presumed hematoma within muscle belly or intramuscular fat planes left   posterior lateral chest wall partially included as above. -The adjacent ribs are intact to the extent included. -Mild amount of deep subcutaneous stranding superficial to the left oblique   abdominal musculature may reflect remnant of previous hemorrhage. Chronicity   unknown. 2. No intra-abdominal ascites. No abdominal or pelvic hematoma or   retroperitoneal hemorrhage identified. 3. Cholelithiasis. Bilateral renal cysts. Extensive diverticulosis.       4. Sizable right inguinal hernia containing mesenteric fat with additional   herniation of portion of the ventral bladder.   -Fat-containing left inguinal hernia. CT Results  (Last 48 hours)    None        CXR Results  (Last 48 hours)    None          Medical Decision Making   I am the first provider for this patient. I reviewed the vital signs, available nursing notes, past medical history, past surgical history, family history and social history. Vital Signs-Reviewed the patient's vital signs. No data found. Records Reviewed: Nursing Notes and Old Medical Records    Provider Notes (Medical Decision Making):   DDx: Retroperitoneal bleeding, Ecchymosis, Subtherapeutic INR    66 yo M who presents with atraumatic ecchymosis to [facility]. Currently taking warfarin. Mild TTP on exam. Stable INR and hgb. CT scan shows hematoma superficial to the muscle. Discussed with pt importance of prompt PCP f/u with continued management of INR. At time of discharge, pt non-toxic appearing in NAD. Pt stable for prompt outpatient follow-up with PCP 1 to 2 days. Patient given strict instructions to return if symptoms worsen. ED Course:   Initial assessment performed. The patients presenting problems have been discussed, and they are in agreement with the care plan formulated and outlined with them. I have encouraged them to ask questions as they arise throughout their visit. Reviewed CT scan with attending, Dr Samuel Hirsch, who agreed with plan to d/c home with prompt PCP and repeat INR. Disposition:  1:55 PM  Discussed lab and imaging results with pt along with dx and treatment plan. Discussed importance of PCP follow up. All questions answered. Pt voiced they understood. Return if sx worsen. PLAN:  1. Discharge Medication List as of 6/27/2022  5:14 PM        2.    Follow-up Information     Follow up With Specialties Details Why Shannon Goodman MD Internal Medicine Physician Schedule an appointment as soon as possible for a visit in 1 day  [address] [facility] [address] [facility] EMERGENCY DEPT Emergency Medicine  As needed, If symptoms worsen [address] [facility] [address]  [phone]        Return to ED if worse     Diagnosis     Clinical Impression:   1. Superficial hematoma        Attestations:    LOVE Butler    Please note that this dictation was completed with [facility], the computer voice recognition software. Quite often unanticipated grammatical, syntax, homophones, and other interpretive errors are inadvertently transcribed by the computer software. Please disregard these errors. Please excuse any errors that have escaped final proofreading. Thank you.

## 2022-06-27 NOTE — ED TRIAGE NOTES
Patient arrives with c/o left sided abd/rib bruising that started last evening. Denies any injury. Denies any pain. Patient on coumadin since February.     Missed dose last PM.

## 2022-06-27 NOTE — DISCHARGE INSTRUCTIONS
Follow up with your primary care provider in 24 hours for warfarin check and continued management and evaluation of bruising

## 2022-10-03 ENCOUNTER — TRANSCRIBE ORDER (OUTPATIENT)
Dept: REGISTRATION | Age: 77
End: 2022-10-03

## 2022-10-03 ENCOUNTER — HOSPITAL ENCOUNTER (OUTPATIENT)
Dept: LAB | Age: 77
Discharge: HOME | End: 2022-10-03
Payer: MEDICARE

## 2022-10-03 DIAGNOSIS — Z01.818 OTHER SPECIFIED PRE-OPERATIVE EXAMINATION: Primary | ICD-10-CM

## 2022-10-03 DIAGNOSIS — Z01.818 OTHER SPECIFIED PRE-OPERATIVE EXAMINATION: ICD-10-CM

## 2022-10-03 LAB
INR PPP: 2.2 (ref 0.8–1.2)
PROTHROMBIN TIME: 24.6 SEC (ref 11.5–15.2)

## 2022-10-03 PROCEDURE — 85610 PROTHROMBIN TIME: CPT

## 2022-10-03 PROCEDURE — 36415 COLL VENOUS BLD VENIPUNCTURE: CPT

## 2022-10-10 ENCOUNTER — OFFICE VISIT (OUTPATIENT)
Dept: CARDIOLOGY CLINIC | Age: 77
End: 2022-10-10
Payer: MEDICARE

## 2022-10-10 VITALS
WEIGHT: 208 LBS | DIASTOLIC BLOOD PRESSURE: 82 MMHG | HEIGHT: 65 IN | OXYGEN SATURATION: 99 % | BODY MASS INDEX: 34.66 KG/M2 | SYSTOLIC BLOOD PRESSURE: 156 MMHG | HEART RATE: 93 BPM

## 2022-10-10 DIAGNOSIS — I25.83 CORONARY ARTERY DISEASE DUE TO LIPID RICH PLAQUE: Primary | ICD-10-CM

## 2022-10-10 DIAGNOSIS — I48.0 PAF (PAROXYSMAL ATRIAL FIBRILLATION) (HCC): ICD-10-CM

## 2022-10-10 DIAGNOSIS — I25.10 CORONARY ARTERY DISEASE DUE TO LIPID RICH PLAQUE: Primary | ICD-10-CM

## 2022-10-10 DIAGNOSIS — I10 ESSENTIAL HYPERTENSION WITH GOAL BLOOD PRESSURE LESS THAN 140/90: ICD-10-CM

## 2022-10-10 DIAGNOSIS — E78.00 PURE HYPERCHOLESTEROLEMIA: ICD-10-CM

## 2022-10-10 PROCEDURE — G8432 DEP SCR NOT DOC, RNG: HCPCS | Performed by: INTERNAL MEDICINE

## 2022-10-10 PROCEDURE — G8754 DIAS BP LESS 90: HCPCS | Performed by: INTERNAL MEDICINE

## 2022-10-10 PROCEDURE — G8753 SYS BP > OR = 140: HCPCS | Performed by: INTERNAL MEDICINE

## 2022-10-10 PROCEDURE — G8536 NO DOC ELDER MAL SCRN: HCPCS | Performed by: INTERNAL MEDICINE

## 2022-10-10 PROCEDURE — G8417 CALC BMI ABV UP PARAM F/U: HCPCS | Performed by: INTERNAL MEDICINE

## 2022-10-10 PROCEDURE — 1123F ACP DISCUSS/DSCN MKR DOCD: CPT | Performed by: INTERNAL MEDICINE

## 2022-10-10 PROCEDURE — 1101F PT FALLS ASSESS-DOCD LE1/YR: CPT | Performed by: INTERNAL MEDICINE

## 2022-10-10 PROCEDURE — 99214 OFFICE O/P EST MOD 30 MIN: CPT | Performed by: INTERNAL MEDICINE

## 2022-10-10 PROCEDURE — G8428 CUR MEDS NOT DOCUMENT: HCPCS | Performed by: INTERNAL MEDICINE

## 2022-10-10 RX ORDER — CARVEDILOL 3.12 MG/1
3.12 TABLET ORAL 2 TIMES DAILY WITH MEALS
Qty: 180 TABLET | Refills: 3 | Status: SHIPPED | OUTPATIENT
Start: 2022-10-10

## 2022-10-10 NOTE — PROGRESS NOTES
[facility]    Mr. Elsie Mcrae is 65 y.o. male with history of CAD: Diabetes, hypertension, hyperlipidemia    Patient is here today for cardiac evaluation. He denies any prior history of MI or CHF. Because of cardiac symptoms, patient underwent cardiac catheterization and 01/2022 which showed multivessel CAD. Patient eventually underwent CABG x5 with LIMA to LAD, SVG to RPDA, sequential SVG to diagonal 2 and ramus intermedius and mid obtuse marginal in 03/2022  Patient had postoperative atrial fibrillation and was started on amiodarone and anticoagulation    Patient denies any cardiac symptoms. He is feeling much better. Currently wearing event monitor. He denies any symptoms to suggest angina or heart failure. Denies any palpitation, presyncope or syncope  Denies any nausea, vomiting, abdominal pain, fever, chills, sputum production. No hematuria or other bleeding complaints    Past Medical History:   Diagnosis Date    Diabetes ([facility] [address]     HLD (hyperlipidemia)     Hypertension     S/P CABG x 5 03/2022    LIMA to LAD, SVG to RPDA, sequential SVG to diagonal 2 and ramus intermedius and mid obtuse marginal    Thyroid disease        Review of Systems:  Cardiac symptoms as noted above in HPI. All others negative. Denies fatigue, malaise, skin rash, joint pain, blurring vision, photophobia, neck pain, hemoptysis, chronic cough, nausea, vomiting, hematuria, burning micturition, BRBPR, chronic headaches. Current Outpatient Medications   Medication Sig    bumetanide (BUMEX) 0.5 mg tablet Take 1 Tablet by mouth daily as needed (swelling). fenofibrate micronized (LOFIBRA) 134 mg capsule Take 134 mg by mouth daily.     metFORMIN ER (GLUCOPHAGE XR) 500 mg tablet TAKE 1 TABLET BY MOUTH ONCE DAILY WITH FOOD    warfarin (COUMADIN) 5 mg tablet TAKE 3 TABLETS BY MOUTH DAY AFTER XARELTO THEN 1 TABLET DAILY    ferrous sulfate 325 mg (65 mg iron) tablet Take 325 mg by mouth daily. insulin nph-regular human rec (NovoLIN 70-30 FlexPen U-100) 100 unit/mL (70-30) inpn Inject 15 units before breakfast and 10 units before supper    potassium chloride (K-DUR, KLOR-CON M20) 20 mEq tablet Take 20 mEq by mouth daily. omeprazole (PRILOSEC) 20 mg capsule Take 20 mg by mouth two (2) times a day. tamsulosin (FLOMAX) 0.4 mg capsule Take 0.4 mg by mouth. atorvastatin (LIPITOR) 80 mg tablet Take 80 mg by mouth daily. nitroglycerin (NITROSTAT) 0.4 mg SL tablet 1 Tablet by SubLINGual route every five (5) minutes as needed for Chest Pain. Up to 3 doses. carvediloL (COREG) 3.125 mg tablet Take 1 Tablet by mouth two (2) times daily (with meals). Indications: high blood pressure    aspirin delayed-release 81 mg tablet Take 1 Tablet by mouth daily. No current facility-administered medications for this visit. Past Surgical History:   Procedure Laterality Date    COLONOSCOPY N/A 3/18/2019    COLONOSCOPY performed by Kaylynn Martinez MD at [facility] ENDOSCOPY    COLONOSCOPY N/A 5/3/2016    COLONOSCOPY with hot snare polypectomy and cold forceps polypectomy performed by Hayden Gregory MD at [facility] ENDOSCOPY    HX GI      hernia repair and colonoscopy       Allergies and Sensitivities:  Allergies   Allergen Reactions    Caffeine Anaphylaxis       Family History:  No family history on file. Social History:  Social History     Tobacco Use    Smoking status: Former    Smokeless tobacco: Never   Substance Use Topics    Alcohol use: Yes     Alcohol/week: 3.0 standard drinks     Types: 3 Shots of liquor per week     Comment: socially    Drug use: No     He  reports that he has quit smoking. He has never used smokeless tobacco.  He  reports current alcohol use of about 3.0 standard drinks per week.     Physical Exam:  BP Readings from Last 3 Encounters:   10/10/22 (!) 150/74   06/27/22 (!) 166/93   05/16/22 128/82         Pulse Readings from Last 3 Encounters:   10/10/22 93 22 81   22 74          Wt Readings from Last 3 Encounters:   10/10/22 94.3 kg (208 lb)   22 91.6 kg (202 lb)   22 95.3 kg (210 lb)       Constitutional: Oriented to person, place, and time. HENT: Head: Normocephalic and atraumatic. Neck: No JVD present. Cardiovascular: Regular rhythm. No murmur, gallop or rubs appreciated  Lung: Breath sounds normal. No respiratory distress. No ronchi or rales appreciated  Abdominal: No tenderness. No rebound and no guarding. Musculoskeletal: There is no significant lower extremity edema. No cynosis    LABS:   @  Lab Results   Component Value Date/Time    WBC 6.7 2022 09:00 AM    HGB 13.5 2022 02:10 PM    HCT 41.8 2022 02:10 PM    PLATELET 400  09:00 AM    MCV 94.4 2022 09:00 AM     Lab Results   Component Value Date/Time    Sodium 140 2022 09:00 AM    Potassium 4.3 2022 09:00 AM    Chloride 108 2022 09:00 AM    CO2 29 2022 09:00 AM    Glucose 114 (H) 2022 09:00 AM    BUN 19 (H) 2022 09:00 AM    Creatinine 1.10 2022 09:00 AM     No flowsheet data found. Lab Results   Component Value Date/Time    ALT (SGPT) 23 2022 09:00 AM     No results found for: HBA1C, RLO1HTHL, HCQ0FTPF, YJH0ZPVY  No results found for: TSH, TSH2, TSH3, TSHP, TSHEXT, TSHEXT    EK/10/2022: Sinus rhythm at 90 bpm.  Poor R wave progression. PVC noted. NUCLEAR CARDIAC STRESS TEST 2022  Interpretation Summary    Nuclear Findings: LVEF measures 40%. TID ratio is 1.15. Nuclear Findings: LV perfusion is abnormal. There is evidence of inducible ischemia. Nuclear Findings: There is a moderate severity left ventricular stress perfusion defect that is medium in size present in the basal to mid inferolateral segment(s) that is partially reversible. The defect is consistent with abnormal perfusion in the LCx territory. Viability in the area is moderate.  There is abnormal wall motion in the defect area. The defect appears to be probable ischemia. Nuclear Findings: Abnormal left ventricular systolic function post-stress. Global function is mildly reduced. Single regional abnormality during stress. LVEF measures 40%. Nuclear Findings: The study is most consistent with myocardial ischemia. Findings suggest a moderate risk of myocardial ischemia. ECHO ADULT COMPLETE 01/11/2022 1/11/2022  Interpretation Summary    Left Ventricle: Left ventricle size is normal. Moderately increased wall thickness. Global hypokinesis present. Moderately reduced left ventricular systolic function with a visually estimated EF of 40 - 45%. Grade II diastolic dysfunction with increased LAP. Mitral Valve: Mildly thickened leaflets. Left Atrium: Left atrium is dilated. Left atrial volume index is mildly increased (35-41 mL/m2). CARDIAC Cath (01/2022)  Conclusion  LM: Distal 10%   LAD: Proximal calcified 60 to 70% eccentric stenosis followed by mid vessel 75% tubular stenosis  Ramus/high OM: Mid vessel 70% stenosis  LCx: Mid LCx extending into ostial OM eccentric calcified 80% stenosis, mid OM 70% stenosis  RCA: Ostial and proximal 100% occlusion with collaterals supplying large caliber distal vessel  Mildly reduced LVEF at 45-50%  LVEDP of 16 mmHg  We will refer patient for CABG evaluation especially with diabetes and mild LV dysfunction  Aggressive medical    IMPRESSION & PLAN:  Mr. Deana Barron is a 65 y.o. with multiple medical problem    CAD:  Cardiac catheterization in 01/2022 showed three-vessel disease as mentioned above  CABG X 5 in 03/2022 ( LIMA to LAD, SVG to RPDA, sequential SVG to diagonal 2 and ramus intermedius and mid obtuse marginal)  Continue aspirin, beta-blocker, statin  Currently without any angina    Atrial fibrillation:  Patient had postoperative post CABG atrial fibrillation in 03/2022  Patient was started on Coumadin and amiodarone in 03/2022.   On exam he appears to be in sinus rhythm. Event monitor in 04/2022 for 30-day, sinus rhythm, no arrhythmia. No A. fib or pauses. Patient did not have atrial fibrillation before. Discussed with the patient that he does not have atrial fibrillation. He would like to stop Coumadin  Amiodarone has been stopped 2 months ago. Currently wearing event monitor. If results of this event monitoring does not show atrial fibrillation, he would like to stop Coumadin. We will have him come back in 3 months to have further discussion. If you would like to stop Coumadin, we can consider aspirin and Plavix. He understand the risk of stroke with possible of history of underlying undetected paroxysmal atrial fibrillation    Ischemic cardiomyopathy:  LVEF 40-45% echocardiogram and cardiac catheterization in 01/2022. S/p CABG x5 in 03/2022. Currently on Bumex, Coreg, Imdur. Will consider repeating echocardiogram in 6 months. If no improvement, will need to consider patient for ACE inhibitor/ARB or Entresto    Hypertension: /74 he was on carvedilol however he has not that medication anymore as he did not have any refill. I am going to restart his carvedilol 3.125 mg twice daily. He will keep checking blood pressure at home    Diabetes: Goal hemoglobin A1c less than 7 is recommended from cardiovascular standpoint. Hyperlipidemia: This is being managed by PCP. Currently on atorvastatin 80 mg daily. Continue same    This plan was discussed with patient who is in agreement. Thank you for allowing me to participate in patient care. Please feel free to call me if you have any question or concern. Tru Rodas MD  Please note: This document has been produced using voice recognition software. Unrecognized errors in transcription may be present.

## 2022-10-10 NOTE — PROGRESS NOTES
Tray Rodriguez presents today for   Chief Complaint   Patient presents with    Follow-up     6 months        Tray Rodriguez preferred language for health care discussion is english/other. Is someone accompanying this pt? no    Is the patient using any DME equipment during [address]? no    Depression Screening:  3 most recent PHQ Screens 5/16/2022   Little interest or pleasure in doing things Not at all   Feeling down, depressed, irritable, or hopeless Not at all   Total Score PHQ 2 0   Trouble falling or staying asleep, or sleeping too much -   Feeling tired or having little energy -   Poor appetite, weight loss, or overeating -   Feeling bad about yourself - or that you are a failure or have let yourself or your family down -   Trouble concentrating on things such as school, work, reading, or watching TV -   Moving or speaking so slowly that other people could have noticed; or the opposite being so fidgety that others notice -   Thoughts of being better off dead, or hurting yourself in some way -   PHQ 9 Score -       Learning Assessment:  Learning Assessment 2/7/2022   PRIMARY LEARNER Patient   PRIMARY LANGUAGE ENGLISH   LEARNER PREFERENCE PRIMARY DEMONSTRATION   ANSWERED BY patient   RELATIONSHIP SELF       Abuse Screening:  Abuse Screening Questionnaire 2/7/2022   Do you ever feel afraid of your partner? N   Are you in a relationship with someone who physically or mentally threatens you? N   Is it safe for you to go home? Y       Fall Risk  Fall Risk Assessment, last 12 mths 5/16/2022   Able to walk? Yes   Fall in past 12 months? 0   Do you feel unsteady? 0   Are you worried about falling 0       Pt currently taking Anticoagulant therapy? ASA 81mg and Brilinta    Coordination of Care:  1. Have you been to the ER, urgent care clinic since your last visit? Hospitalized since your last visit? no    2.  Have you seen or consulted any other health care providers outside of the [address] [place] [address] since your last visit? Include any pap smears or colon screening.  no

## 2022-10-10 NOTE — LETTER
10/10/2022    Patient: Brandi Alanis   YOB: 1945   Date of Visit: 10/10/2022     Kristina Arredondo MD  [facility] [place] [id number]  Via Fax: [phone]    Dear Kristina Arredondo MD,      Thank you for referring Mr. Servando Arias to [address] [place] [address] for evaluation. My notes for this consultation are attached. If you have questions, please do not hesitate to call me. I look forward to following your patient along with you.       Sincerely,    Jes Dukes MD

## 2022-11-29 ENCOUNTER — TELEPHONE (OUTPATIENT)
Dept: CARDIOLOGY CLINIC | Age: 77
End: 2022-11-29

## 2022-11-29 NOTE — TELEPHONE ENCOUNTER
Sherley Echeverria from Dr. Luis Morales office, two pt identifiers confirmed. Dr. Deb Gastelum would like to discuss pt's recent holter results and coumadin use with Dr. Jai Bustillo. Direct line to clinical staff: [phone]. Will discuss with provider today. Dr. Deb Gastelum would like to discuss by next week if possible.

## 2022-11-29 NOTE — TELEPHONE ENCOUNTER
Doc Joshua office called 11-29-22.  Ms Kelli Mtz [phone] from office need to go over holter monitor results

## 2022-12-30 DIAGNOSIS — I25.10 CORONARY ARTERY DISEASE DUE TO LIPID RICH PLAQUE: Primary | ICD-10-CM

## 2022-12-30 DIAGNOSIS — I25.83 CORONARY ARTERY DISEASE DUE TO LIPID RICH PLAQUE: Primary | ICD-10-CM

## 2022-12-30 DIAGNOSIS — I48.0 PAF (PAROXYSMAL ATRIAL FIBRILLATION) (HCC): ICD-10-CM

## 2023-01-04 DIAGNOSIS — I48.0 PAF (PAROXYSMAL ATRIAL FIBRILLATION) (HCC): ICD-10-CM

## 2023-01-04 DIAGNOSIS — I25.10 CORONARY ARTERY DISEASE DUE TO LIPID RICH PLAQUE: ICD-10-CM

## 2023-01-04 DIAGNOSIS — I25.83 CORONARY ARTERY DISEASE DUE TO LIPID RICH PLAQUE: ICD-10-CM

## 2023-01-06 NOTE — PROGRESS NOTES
Reddy Lara presents today for a 3 month check-up. He was last seen by Dr. Ron Estes on 10/10/22 and during that visit, he was wearing an event monitor. Mr. Arvin Lucas wanted to discontinue his Coumadin if the monitor did not show the return of AFIB. His event monitor did not show any AFIB and his coumadin was discontinued. He saw his PCP, Dr. Mango William, this morning and he increased his carvedilol dose due to elevated blood pressures. He is scheduled to return to see him in 2 months. Today, he offers no cardiac complaints and states that he has been feeling well. Denies chest pain, tightness, heaviness, and palpitations. Denies shortness of breath at rest, dyspnea on exertion, orthopnea and PND. Denies abdominal bloating. Denies lightheadedness, dizziness, and syncope. Denies lower extremity edema and claudication. Denies nausea, vomiting, diarrhea, melena, hematochezia. Denies hematuria, urgency, frequency. Denies fever, chills. He is a 65year old male with history of CAD (s/p CABG x 5 in March 2022), AFIB (post-op), diabetes, hypertension, hyperlipidemia. He underwent cardiac catheterization on 1/18/22 and it demonstrated:   LM: Distal 10% next  LAD: Proximal calcified 60 to 70% eccentric stenosis followed by mid vessel 75% tubular stenosis  Ramus/high OM: Mid vessel 70% stenosis  LCx: Mid LCx extending into ostial OM eccentric calcified 80% stenosis, mid OM 70% stenosis  RCA: Ostial and proximal 100% occlusion with collaterals supplying large caliber distal vessel  Mildly reduced LVEF at 45-50%  LVEDP of 16 mmHg    He was referred to cardiothoracic surgery and he underwent CABG x 5 in March 2022. His bypasses include: LIMA to LAD, SVG to RPDA, sequential SVG to diagonal 2 and ramus intermedius and mid obtuse marginal.    His last echo was done in Jan. 2022 and it showed an EF of 40-45%, global hypokinesis, grade 2 diastolic dysfunction.       PMH:  Past Medical History:   Diagnosis Date Diabetes ([facility] [address]     HLD (hyperlipidemia)     Hypertension     S/P CABG x 5 03/2022    LIMA to LAD, SVG to RPDA, sequential SVG to diagonal 2 and ramus intermedius and mid obtuse marginal    Thyroid disease        PSH:  Past Surgical History:   Procedure Laterality Date    COLONOSCOPY N/A 3/18/2019    COLONOSCOPY performed by Duy Perez MD at [facility] ENDOSCOPY    COLONOSCOPY N/A 5/3/2016    COLONOSCOPY with hot snare polypectomy and cold forceps polypectomy performed by Morgan Baltazar MD at [facility] ENDOSCOPY    HX GI      hernia repair and colonoscopy       MEDS:  Current Outpatient Medications   Medication Sig    carvediloL (Coreg) 6.25 mg tablet Take 6.25 mg by mouth two (2) times daily (with meals). bumetanide (BUMEX) 0.5 mg tablet Take 1 Tablet by mouth daily as needed (swelling). fenofibrate micronized (LOFIBRA) 134 mg capsule Take 134 mg by mouth daily. metFORMIN ER (GLUCOPHAGE XR) 500 mg tablet TAKE 1 TABLET BY MOUTH ONCE DAILY WITH FOOD    ferrous sulfate 325 mg (65 mg iron) tablet Take 325 mg by mouth daily. insulin nph-regular human rec (NovoLIN 70-30 FlexPen U-100) 100 unit/mL (70-30) inpn Inject 15 units before breakfast and 10 units before supper    potassium chloride (K-DUR, KLOR-CON M20) 20 mEq tablet Take 20 mEq by mouth daily. omeprazole (PRILOSEC) 20 mg capsule Take 20 mg by mouth two (2) times a day. tamsulosin (FLOMAX) 0.4 mg capsule Take 0.4 mg by mouth. atorvastatin (LIPITOR) 80 mg tablet Take 80 mg by mouth daily. nitroglycerin (NITROSTAT) 0.4 mg SL tablet 1 Tablet by SubLINGual route every five (5) minutes as needed for Chest Pain. Up to 3 doses. aspirin delayed-release 81 mg tablet Take 1 Tablet by mouth daily. No current facility-administered medications for this visit. Allergies and Sensitivities:  Allergies   Allergen Reactions    Caffeine Anaphylaxis       Family History:  No family history on file. Social History:  He  reports that he has quit smoking. He has never used smokeless tobacco.  He  reports current alcohol use of about 3.0 standard drinks per week. Physical:  Visit Vitals  BP (!) 166/84 (BP 1 Location: Left upper arm, BP Patient Position: Sitting, BP Cuff Size: Adult)   Pulse 70   Ht 5' 5\" (1.651 m)   Wt 96.6 kg (213 lb)   SpO2 95%   BMI 35.45 kg/m²         Exam:  Neck:  Supple, no JVD, no carotid bruits  CV:  Normal S1 and  S2, no murmurs, rubs, or gallops noted  Lungs:  Clear to ausculation throughout, no wheezes or rales  Abd:  Soft, non-tender, non-distended with good bowel sounds. No hepatosplenomegaly  Extremities:  No edema      Data:  EKG:        LABS:  Lab Results   Component Value Date/Time    Sodium 140 06/27/2022 09:00 AM    Potassium 4.3 06/27/2022 09:00 AM    Chloride 108 06/27/2022 09:00 AM    CO2 29 06/27/2022 09:00 AM    Glucose 114 (H) 06/27/2022 09:00 AM    BUN 19 (H) 06/27/2022 09:00 AM    Creatinine 1.10 06/27/2022 09:00 AM     No results found for: CHOL, CHOLX, CHLST, CHOLV, HDL, HDLP, LDL, LDLC, DLDLP, TGLX, TRIGL, TRIGP, CHHD, CHHDX  Lab Results   Component Value Date/Time    ALT (SGPT) 23 06/27/2022 09:00 AM         Impression/Plan:  CAD, s/p CABG x 5 in March 2022, stable, no complaints of chest pain  Hypertension, blood pressure elevated and PCP adjusted medications this morning  Hyperlipidemia, on atorvastatin 80mg  AFIB, occurred post-op, event monitor shows no return of AFIB, Coumadin was discontinued  Diabetes mellitus, recommend Hgb A1c less than 7% from cardiac standpoint    Mr. Zak Maldonado was seen today for a 3 month check-up. Overall, he states that he has been feeling well and denies any cardiac complaints. He states that Dr. Jon Hernandez increased his carvedilol to 6.25mg BID this morning and will be following up with him in 2 months. Will defer further management of his blood pressure to him and will be available to assist as needed. His blood pressure was elevated in our office as well.       I discussed event monitor results with him. It showed normal sinus rhythm with PVCs (<1% burden). This was explained to him. He had 2 runs of NSVT (6 and 9 beats) as well. Advised to limit sodium intake and admits to dietary indiscretion over the holidays. His weight is up 5 pounds. Time:  30 minutes    He will follow-up with Dr. Alhaji Knott as scheduled and as needed. Dion Mak MSN, FNP-BC    Please note:  Portions of this chart were created with Dragon medical speech to text program.  Unrecognized errors may be present.

## 2023-01-09 ENCOUNTER — OFFICE VISIT (OUTPATIENT)
Dept: CARDIOLOGY CLINIC | Age: 78
End: 2023-01-09
Payer: MEDICARE

## 2023-01-09 VITALS
HEART RATE: 70 BPM | BODY MASS INDEX: 35.49 KG/M2 | DIASTOLIC BLOOD PRESSURE: 84 MMHG | HEIGHT: 65 IN | WEIGHT: 213 LBS | OXYGEN SATURATION: 95 % | SYSTOLIC BLOOD PRESSURE: 166 MMHG

## 2023-01-09 DIAGNOSIS — I48.0 PAF (PAROXYSMAL ATRIAL FIBRILLATION) (HCC): ICD-10-CM

## 2023-01-09 DIAGNOSIS — I10 ESSENTIAL HYPERTENSION WITH GOAL BLOOD PRESSURE LESS THAN 140/90: ICD-10-CM

## 2023-01-09 DIAGNOSIS — I25.5 ISCHEMIC CARDIOMYOPATHY: ICD-10-CM

## 2023-01-09 DIAGNOSIS — I25.10 CORONARY ARTERY DISEASE DUE TO LIPID RICH PLAQUE: Primary | ICD-10-CM

## 2023-01-09 DIAGNOSIS — I25.83 CORONARY ARTERY DISEASE DUE TO LIPID RICH PLAQUE: Primary | ICD-10-CM

## 2023-01-09 DIAGNOSIS — Z95.1 HX OF CABG: ICD-10-CM

## 2023-01-09 PROCEDURE — G8432 DEP SCR NOT DOC, RNG: HCPCS | Performed by: NURSE PRACTITIONER

## 2023-01-09 PROCEDURE — 3079F DIAST BP 80-89 MM HG: CPT | Performed by: NURSE PRACTITIONER

## 2023-01-09 PROCEDURE — 99214 OFFICE O/P EST MOD 30 MIN: CPT | Performed by: NURSE PRACTITIONER

## 2023-01-09 PROCEDURE — 1123F ACP DISCUSS/DSCN MKR DOCD: CPT | Performed by: NURSE PRACTITIONER

## 2023-01-09 PROCEDURE — G8427 DOCREV CUR MEDS BY ELIG CLIN: HCPCS | Performed by: NURSE PRACTITIONER

## 2023-01-09 PROCEDURE — 3077F SYST BP >= 140 MM HG: CPT | Performed by: NURSE PRACTITIONER

## 2023-01-09 PROCEDURE — G8417 CALC BMI ABV UP PARAM F/U: HCPCS | Performed by: NURSE PRACTITIONER

## 2023-01-09 PROCEDURE — 1101F PT FALLS ASSESS-DOCD LE1/YR: CPT | Performed by: NURSE PRACTITIONER

## 2023-01-09 PROCEDURE — G8536 NO DOC ELDER MAL SCRN: HCPCS | Performed by: NURSE PRACTITIONER

## 2023-01-09 RX ORDER — CARVEDILOL 6.25 MG/1
6.25 TABLET ORAL 2 TIMES DAILY WITH MEALS
COMMUNITY

## 2023-01-09 RX ORDER — AMLODIPINE BESYLATE 2.5 MG/1
2.5 TABLET ORAL DAILY
COMMUNITY
End: 2023-01-09 | Stop reason: CLARIF

## 2023-01-09 NOTE — PATIENT INSTRUCTIONS
Continue present medication regimen  Follow-up with Dr. Scar King as scheduled and as needed   New suite number is [address] [facility] ([address] [facility])   New phone number is [phone]

## 2023-01-09 NOTE — PROGRESS NOTES
Dwight Norris presents today for   Chief Complaint   Patient presents with    Follow-up     3 month follow up- no complaints            Dwight Norris preferred language for health care discussion is english/other. Is someone accompanying this pt? Yes, wife     Is the patient using any DME equipment during [address]? no    Depression Screening:  3 most recent PHQ Screens 1/9/2023   Little interest or pleasure in doing things Not at all   Feeling down, depressed, irritable, or hopeless Not at all   Total Score PHQ 2 0   Trouble falling or staying asleep, or sleeping too much -   Feeling tired or having little energy -   Poor appetite, weight loss, or overeating -   Feeling bad about yourself - or that you are a failure or have let yourself or your family down -   Trouble concentrating on things such as school, work, reading, or watching TV -   Moving or speaking so slowly that other people could have noticed; or the opposite being so fidgety that others notice -   Thoughts of being better off dead, or hurting yourself in some way -   PHQ 9 Score -       Learning Assessment:  Learning Assessment 2/7/2022   PRIMARY LEARNER Patient   PRIMARY LANGUAGE ENGLISH   LEARNER PREFERENCE PRIMARY DEMONSTRATION   ANSWERED BY patient   RELATIONSHIP SELF       Abuse Screening:  Abuse Screening Questionnaire 1/9/2023   Do you ever feel afraid of your partner? N   Are you in a relationship with someone who physically or mentally threatens you? N   Is it safe for you to go home? Y       Fall Risk  Fall Risk Assessment, last 12 mths 1/9/2023   Able to walk? Yes   Fall in past 12 months? 0   Do you feel unsteady? 0   Are you worried about falling 0       Pt currently taking Anticoagulant therapy? no    Coordination of Care:  1. Have you been to the ER, urgent care clinic since your last visit? Hospitalized since your last visit? no    2.  Have you seen or consulted any other health care providers outside of the [address] [place] [address] since your last visit? Include any pap smears or colon screening.  no

## 2023-01-13 DIAGNOSIS — I25.10 CORONARY ARTERY DISEASE DUE TO LIPID RICH PLAQUE: Primary | ICD-10-CM

## 2023-01-13 DIAGNOSIS — I25.83 CORONARY ARTERY DISEASE DUE TO LIPID RICH PLAQUE: Primary | ICD-10-CM

## 2023-05-25 ENCOUNTER — HOSPITAL ENCOUNTER (OUTPATIENT)
Facility: HOSPITAL | Age: 78
Discharge: HOME | End: 2023-05-25
Payer: MEDICARE

## 2023-05-25 DIAGNOSIS — E11.9 TYPE 2 DIABETES MELLITUS WITHOUT COMPLICATION, UNSPECIFIED WHETHER LONG TERM INSULIN USE (HCC): ICD-10-CM

## 2023-05-25 LAB
EST. AVERAGE GLUCOSE BLD GHB EST-MCNC: 128 MG/DL
HBA1C MFR BLD: 6.1 % (ref 4.2–5.6)

## 2023-05-25 PROCEDURE — 83036 HEMOGLOBIN GLYCOSYLATED A1C: CPT

## 2023-05-25 PROCEDURE — 36415 COLL VENOUS BLD VENIPUNCTURE: CPT

## 2024-01-10 RX ORDER — CARVEDILOL 3.12 MG/1
TABLET ORAL
Qty: 180 TABLET | Refills: 0 | Status: SHIPPED | OUTPATIENT
Start: 2024-01-10

## 2024-02-29 ENCOUNTER — TRANSCRIBE ORDERS (OUTPATIENT)
Facility: HOSPITAL | Age: 79
End: 2024-02-29

## 2024-02-29 ENCOUNTER — HOSPITAL ENCOUNTER (OUTPATIENT)
Facility: HOSPITAL | Age: 79
Discharge: HOME | End: 2024-02-29
Payer: MEDICARE

## 2024-02-29 DIAGNOSIS — E78.5 DYSLIPIDEMIA: ICD-10-CM

## 2024-02-29 DIAGNOSIS — N18.2 CKD (CHRONIC KIDNEY DISEASE) STAGE 2, GFR 60-89 ML/MIN: ICD-10-CM

## 2024-02-29 DIAGNOSIS — E11.69 TYPE 2 DIABETES MELLITUS WITH OTHER SPECIFIED COMPLICATION, UNSPECIFIED WHETHER LONG TERM INSULIN USE (HCC): ICD-10-CM

## 2024-02-29 DIAGNOSIS — N18.2 CKD (CHRONIC KIDNEY DISEASE) STAGE 2, GFR 60-89 ML/MIN: Primary | ICD-10-CM

## 2024-02-29 LAB
25(OH)D3 SERPL-MCNC: 58.5 NG/ML (ref 30–100)
ALBUMIN SERPL-MCNC: 3.5 G/DL (ref 3.4–5)
ALBUMIN/GLOB SERPL: 1.3 (ref 0.8–1.7)
ALP SERPL-CCNC: 93 U/L (ref 45–117)
ALT SERPL-CCNC: 20 U/L (ref 16–61)
ANION GAP SERPL CALC-SCNC: 3 MMOL/L (ref 3–18)
APPEARANCE UR: CLEAR
AST SERPL-CCNC: 14 U/L (ref 10–38)
BILIRUB SERPL-MCNC: 0.8 MG/DL (ref 0.2–1)
BILIRUB UR QL: NEGATIVE
BUN SERPL-MCNC: 13 MG/DL (ref 7–18)
BUN/CREAT SERPL: 15 (ref 12–20)
CALCIUM SERPL-MCNC: 8.7 MG/DL (ref 8.5–10.1)
CALCIUM SERPL-MCNC: 8.8 MG/DL (ref 8.5–10.1)
CHLORIDE SERPL-SCNC: 108 MMOL/L (ref 100–111)
CHOLEST SERPL-MCNC: 107 MG/DL
CO2 SERPL-SCNC: 28 MMOL/L (ref 21–32)
COLOR UR: ABNORMAL
CREAT SERPL-MCNC: 0.87 MG/DL (ref 0.6–1.3)
ERYTHROCYTE [DISTWIDTH] IN BLOOD BY AUTOMATED COUNT: 13.9 % (ref 11.6–14.5)
EST. AVERAGE GLUCOSE BLD GHB EST-MCNC: 105 MG/DL
GLOBULIN SER CALC-MCNC: 2.7 G/DL (ref 2–4)
GLUCOSE SERPL-MCNC: 130 MG/DL (ref 74–99)
GLUCOSE UR STRIP.AUTO-MCNC: >1000 MG/DL
HBA1C MFR BLD: 5.3 % (ref 4.2–5.6)
HCT VFR BLD AUTO: 40.8 % (ref 36–48)
HDLC SERPL-MCNC: 44 MG/DL (ref 40–60)
HDLC SERPL: 2.4 (ref 0–5)
HGB BLD-MCNC: 13.1 G/DL (ref 13–16)
HGB UR QL STRIP: NEGATIVE
KETONES UR QL STRIP.AUTO: NEGATIVE MG/DL
LDLC SERPL CALC-MCNC: 41.6 MG/DL (ref 0–100)
LEUKOCYTE ESTERASE UR QL STRIP.AUTO: NEGATIVE
LIPID PANEL: NORMAL
MAGNESIUM SERPL-MCNC: 2 MG/DL (ref 1.6–2.6)
MCH RBC QN AUTO: 33.4 PG (ref 24–34)
MCHC RBC AUTO-ENTMCNC: 32.1 G/DL (ref 31–37)
MCV RBC AUTO: 104.1 FL (ref 78–100)
NITRITE UR QL STRIP.AUTO: NEGATIVE
NRBC # BLD: 0 K/UL (ref 0–0.01)
NRBC BLD-RTO: 0 PER 100 WBC
PH UR STRIP: 6.5 (ref 5–8)
PHOSPHATE SERPL-MCNC: 3.8 MG/DL (ref 2.5–4.9)
PLATELET # BLD AUTO: 226 K/UL (ref 135–420)
PMV BLD AUTO: 10.5 FL (ref 9.2–11.8)
POTASSIUM SERPL-SCNC: 4.2 MMOL/L (ref 3.5–5.5)
PROT SERPL-MCNC: 6.2 G/DL (ref 6.4–8.2)
PROT UR STRIP-MCNC: NEGATIVE MG/DL
PROT UR-MCNC: 25 MG/DL
PTH-INTACT SERPL-MCNC: 47.2 PG/ML (ref 18.4–88)
RBC # BLD AUTO: 3.92 M/UL (ref 4.35–5.65)
SODIUM SERPL-SCNC: 139 MMOL/L (ref 136–145)
SP GR UR REFRACTOMETRY: 1.02 (ref 1–1.03)
TRIGL SERPL-MCNC: 107 MG/DL
URATE SERPL-MCNC: 3.5 MG/DL (ref 2.6–7.2)
UROBILINOGEN UR QL STRIP.AUTO: 1 EU/DL (ref 0.2–1)
VLDLC SERPL CALC-MCNC: 21.4 MG/DL
WBC # BLD AUTO: 4.8 K/UL (ref 4.6–13.2)

## 2024-02-29 PROCEDURE — 85027 COMPLETE CBC AUTOMATED: CPT

## 2024-02-29 PROCEDURE — 80053 COMPREHEN METABOLIC PANEL: CPT

## 2024-02-29 PROCEDURE — 36415 COLL VENOUS BLD VENIPUNCTURE: CPT

## 2024-02-29 PROCEDURE — 80061 LIPID PANEL: CPT

## 2024-02-29 PROCEDURE — 83735 ASSAY OF MAGNESIUM: CPT

## 2024-02-29 PROCEDURE — 84550 ASSAY OF BLOOD/URIC ACID: CPT

## 2024-02-29 PROCEDURE — 84100 ASSAY OF PHOSPHORUS: CPT

## 2024-02-29 PROCEDURE — 84156 ASSAY OF PROTEIN URINE: CPT

## 2024-02-29 PROCEDURE — 83036 HEMOGLOBIN GLYCOSYLATED A1C: CPT

## 2024-02-29 PROCEDURE — 81003 URINALYSIS AUTO W/O SCOPE: CPT

## 2024-02-29 PROCEDURE — 83970 ASSAY OF PARATHORMONE: CPT

## 2024-02-29 PROCEDURE — 82306 VITAMIN D 25 HYDROXY: CPT

## 2024-10-17 ENCOUNTER — TRANSCRIBE ORDERS (OUTPATIENT)
Facility: HOSPITAL | Age: 79
End: 2024-10-17

## 2024-10-17 ENCOUNTER — HOSPITAL ENCOUNTER (OUTPATIENT)
Facility: HOSPITAL | Age: 79
Discharge: HOME | End: 2024-10-20
Payer: MEDICARE

## 2024-10-17 DIAGNOSIS — N18.2 CHRONIC KIDNEY DISEASE, STAGE II (MILD): Primary | ICD-10-CM

## 2024-10-17 DIAGNOSIS — E78.5 DYSLIPIDEMIA: Primary | ICD-10-CM

## 2024-10-17 DIAGNOSIS — N18.2 CHRONIC KIDNEY DISEASE, STAGE II (MILD): ICD-10-CM

## 2024-10-17 DIAGNOSIS — E13.69 OTHER SPECIFIED DIABETES MELLITUS WITH OTHER SPECIFIED COMPLICATION, UNSPECIFIED WHETHER LONG TERM INSULIN USE (HCC): ICD-10-CM

## 2024-10-17 DIAGNOSIS — D64.9 ANEMIA, UNSPECIFIED TYPE: ICD-10-CM

## 2024-10-17 DIAGNOSIS — E78.5 DYSLIPIDEMIA: ICD-10-CM

## 2024-10-17 LAB
25(OH)D3 SERPL-MCNC: 56.6 NG/ML (ref 30–100)
ALBUMIN SERPL-MCNC: 3.9 G/DL (ref 3.4–5)
ALBUMIN/GLOB SERPL: 1.4 (ref 0.8–1.7)
ALP SERPL-CCNC: 92 U/L (ref 45–117)
ALT SERPL-CCNC: 16 U/L (ref 16–61)
ANION GAP SERPL CALC-SCNC: 6 MMOL/L (ref 3–18)
APPEARANCE UR: CLEAR
AST SERPL-CCNC: 14 U/L (ref 10–38)
BASOPHILS # BLD: 0 K/UL (ref 0–0.1)
BASOPHILS NFR BLD: 1 % (ref 0–2)
BILIRUB SERPL-MCNC: 2.1 MG/DL (ref 0.2–1)
BILIRUB UR QL: NEGATIVE
BUN SERPL-MCNC: 15 MG/DL (ref 7–18)
BUN/CREAT SERPL: 19 (ref 12–20)
CALCIUM SERPL-MCNC: 9.4 MG/DL (ref 8.5–10.1)
CALCIUM SERPL-MCNC: 9.7 MG/DL (ref 8.5–10.1)
CHLORIDE SERPL-SCNC: 102 MMOL/L (ref 100–111)
CHOLEST SERPL-MCNC: 149 MG/DL
CO2 SERPL-SCNC: 28 MMOL/L (ref 21–32)
COLOR UR: ABNORMAL
CREAT SERPL-MCNC: 0.81 MG/DL (ref 0.6–1.3)
CREAT UR-MCNC: 19 MG/DL (ref 30–125)
DIFFERENTIAL METHOD BLD: ABNORMAL
EOSINOPHIL # BLD: 0.1 K/UL (ref 0–0.4)
EOSINOPHIL NFR BLD: 1 % (ref 0–5)
ERYTHROCYTE [DISTWIDTH] IN BLOOD BY AUTOMATED COUNT: 13.1 % (ref 11.6–14.5)
EST. AVERAGE GLUCOSE BLD GHB EST-MCNC: 134 MG/DL
FERRITIN SERPL-MCNC: 136 NG/ML (ref 8–388)
FOLATE SERPL-MCNC: >20 NG/ML (ref 3.1–17.5)
GLOBULIN SER CALC-MCNC: 2.8 G/DL (ref 2–4)
GLUCOSE SERPL-MCNC: 135 MG/DL (ref 74–99)
GLUCOSE UR STRIP.AUTO-MCNC: >1000 MG/DL
HBA1C MFR BLD: 6.3 % (ref 4.2–5.6)
HCT VFR BLD AUTO: 51.5 % (ref 36–48)
HDLC SERPL-MCNC: 68 MG/DL (ref 40–60)
HDLC SERPL: 2.2 (ref 0–5)
HGB BLD-MCNC: 17.4 G/DL (ref 13–16)
HGB UR QL STRIP: NEGATIVE
IMM GRANULOCYTES # BLD AUTO: 0 K/UL (ref 0–0.04)
IMM GRANULOCYTES NFR BLD AUTO: 0 % (ref 0–0.5)
IRON SERPL-MCNC: 152 UG/DL (ref 50–175)
KETONES UR QL STRIP.AUTO: NEGATIVE MG/DL
LDLC SERPL CALC-MCNC: 59.4 MG/DL (ref 0–100)
LEUKOCYTE ESTERASE UR QL STRIP.AUTO: NEGATIVE
LIPID PANEL: ABNORMAL
LYMPHOCYTES # BLD: 0.8 K/UL (ref 0.9–3.6)
LYMPHOCYTES NFR BLD: 13 % (ref 21–52)
MAGNESIUM SERPL-MCNC: 1.5 MG/DL (ref 1.6–2.6)
MCH RBC QN AUTO: 34.2 PG (ref 24–34)
MCHC RBC AUTO-ENTMCNC: 33.8 G/DL (ref 31–37)
MCV RBC AUTO: 101.2 FL (ref 78–100)
MONOCYTES # BLD: 0.6 K/UL (ref 0.05–1.2)
MONOCYTES NFR BLD: 10 % (ref 3–10)
NEUTS SEG # BLD: 5.1 K/UL (ref 1.8–8)
NEUTS SEG NFR BLD: 76 % (ref 40–73)
NITRITE UR QL STRIP.AUTO: NEGATIVE
NRBC # BLD: 0 K/UL (ref 0–0.01)
NRBC BLD-RTO: 0 PER 100 WBC
PH UR STRIP: 6.5 (ref 5–8)
PHOSPHATE SERPL-MCNC: 4 MG/DL (ref 2.5–4.9)
PLATELET # BLD AUTO: 182 K/UL (ref 135–420)
PMV BLD AUTO: 11.9 FL (ref 9.2–11.8)
POTASSIUM SERPL-SCNC: 4.1 MMOL/L (ref 3.5–5.5)
PROT SERPL-MCNC: 6.7 G/DL (ref 6.4–8.2)
PROT UR STRIP-MCNC: NEGATIVE MG/DL
PROT UR-MCNC: 13 MG/DL
PROT/CREAT UR-RTO: 0.7
PTH-INTACT SERPL-MCNC: 26.6 PG/ML (ref 18.4–88)
RBC # BLD AUTO: 5.09 M/UL (ref 4.35–5.65)
SODIUM SERPL-SCNC: 136 MMOL/L (ref 136–145)
SP GR UR REFRACTOMETRY: 1.01 (ref 1–1.03)
TRIGL SERPL-MCNC: 108 MG/DL
UROBILINOGEN UR QL STRIP.AUTO: 1 EU/DL (ref 0.2–1)
VIT B12 SERPL-MCNC: 685 PG/ML (ref 211–911)
VLDLC SERPL CALC-MCNC: 21.6 MG/DL
WBC # BLD AUTO: 6.7 K/UL (ref 4.6–13.2)

## 2024-10-17 PROCEDURE — 80061 LIPID PANEL: CPT

## 2024-10-17 PROCEDURE — 83970 ASSAY OF PARATHORMONE: CPT

## 2024-10-17 PROCEDURE — 82607 VITAMIN B-12: CPT

## 2024-10-17 PROCEDURE — 83540 ASSAY OF IRON: CPT

## 2024-10-17 PROCEDURE — 82570 ASSAY OF URINE CREATININE: CPT

## 2024-10-17 PROCEDURE — 80053 COMPREHEN METABOLIC PANEL: CPT

## 2024-10-17 PROCEDURE — 85025 COMPLETE CBC W/AUTO DIFF WBC: CPT

## 2024-10-17 PROCEDURE — 83735 ASSAY OF MAGNESIUM: CPT

## 2024-10-17 PROCEDURE — 36415 COLL VENOUS BLD VENIPUNCTURE: CPT

## 2024-10-17 PROCEDURE — 82306 VITAMIN D 25 HYDROXY: CPT

## 2024-10-17 PROCEDURE — 82728 ASSAY OF FERRITIN: CPT

## 2024-10-17 PROCEDURE — 84100 ASSAY OF PHOSPHORUS: CPT

## 2024-10-17 PROCEDURE — 84156 ASSAY OF PROTEIN URINE: CPT

## 2024-10-17 PROCEDURE — 81003 URINALYSIS AUTO W/O SCOPE: CPT

## 2024-10-17 PROCEDURE — 83036 HEMOGLOBIN GLYCOSYLATED A1C: CPT

## 2024-10-17 PROCEDURE — 82746 ASSAY OF FOLIC ACID SERUM: CPT

## 2024-10-18 ENCOUNTER — OFFICE VISIT (OUTPATIENT)
Age: 79
End: 2024-10-18
Payer: MEDICARE

## 2024-10-18 VITALS
SYSTOLIC BLOOD PRESSURE: 122 MMHG | HEIGHT: 65 IN | WEIGHT: 200 LBS | DIASTOLIC BLOOD PRESSURE: 74 MMHG | RESPIRATION RATE: 94 BRPM | HEART RATE: 88 BPM | BODY MASS INDEX: 33.32 KG/M2

## 2024-10-18 DIAGNOSIS — I25.83 CORONARY ARTERY DISEASE DUE TO LIPID RICH PLAQUE: Primary | ICD-10-CM

## 2024-10-18 DIAGNOSIS — I48.0 PAF (PAROXYSMAL ATRIAL FIBRILLATION) (HCC): ICD-10-CM

## 2024-10-18 DIAGNOSIS — E78.00 PURE HYPERCHOLESTEROLEMIA: ICD-10-CM

## 2024-10-18 DIAGNOSIS — I10 ESSENTIAL HYPERTENSION WITH GOAL BLOOD PRESSURE LESS THAN 140/90: ICD-10-CM

## 2024-10-18 DIAGNOSIS — I25.10 CORONARY ARTERY DISEASE DUE TO LIPID RICH PLAQUE: Primary | ICD-10-CM

## 2024-10-18 PROCEDURE — 3078F DIAST BP <80 MM HG: CPT | Performed by: INTERNAL MEDICINE

## 2024-10-18 PROCEDURE — 1036F TOBACCO NON-USER: CPT | Performed by: INTERNAL MEDICINE

## 2024-10-18 PROCEDURE — 99214 OFFICE O/P EST MOD 30 MIN: CPT | Performed by: INTERNAL MEDICINE

## 2024-10-18 PROCEDURE — G8484 FLU IMMUNIZE NO ADMIN: HCPCS | Performed by: INTERNAL MEDICINE

## 2024-10-18 PROCEDURE — 1123F ACP DISCUSS/DSCN MKR DOCD: CPT | Performed by: INTERNAL MEDICINE

## 2024-10-18 PROCEDURE — 3074F SYST BP LT 130 MM HG: CPT | Performed by: INTERNAL MEDICINE

## 2024-10-18 PROCEDURE — G8427 DOCREV CUR MEDS BY ELIG CLIN: HCPCS | Performed by: INTERNAL MEDICINE

## 2024-10-18 PROCEDURE — G8417 CALC BMI ABV UP PARAM F/U: HCPCS | Performed by: INTERNAL MEDICINE

## 2024-10-18 RX ORDER — ASPIRIN 81 MG/1
81 TABLET ORAL DAILY
Qty: 90 TABLET | Refills: 0 | Status: SHIPPED | OUTPATIENT
Start: 2024-10-18

## 2024-10-18 RX ORDER — DAPAGLIFLOZIN 10 MG/1
10 TABLET, FILM COATED ORAL DAILY
COMMUNITY
Start: 2024-09-23

## 2024-10-18 RX ORDER — SACUBITRIL AND VALSARTAN 24; 26 MG/1; MG/1
1 TABLET, FILM COATED ORAL 2 TIMES DAILY
COMMUNITY
Start: 2024-09-22

## 2024-10-18 NOTE — PROGRESS NOTES
Normal  5.7 - 6.4         Consider Prediabetes  >6.5              Consider Diabetes       No results found for: \"TSH\", \"TSHFT4\", \"TSHELE\", \"CMA0NOT\", \"TSHHS\"    0  TRANSTHORACIC ECHOCARDIOGRAM (TTE) COMPLETE (CONTRAST/BUBBLE/3D PRN) 01/11/2022 11:32 AM, 01/11/2022 12:00 AM (Final)    Left Ventricle: Left ventricle size is normal. Moderately increased wall thickness. Global hypokinesis present. Moderately reduced left ventricular systolic function with a visually estimated EF of 40 - 45%. Grade II diastolic dysfunction with increased LAP.    Mitral Valve: Mildly thickened leaflets.    Left Atrium: Left atrium is dilated. Left atrial volume index is mildly increased (35-41 mL/m2).    CARDIAC Cath (01/2022)   Conclusion   LM: Distal 10%    LAD: Proximal calcified 60-70% eccentric stenosis followed by mid vessel 75% tubular stenosis   Ramus/high OM: Mid vessel 70% stenosis   LCx: Mid LCx extending into ostial OM eccentric calcified 80% stenosis, mid OM 70% stenosis   RCA: Ostial and proximal 100% occlusion with collaterals supplying large caliber distal vessel   Mildly reduced LVEF at 45-50%   LVEDP of 16 mmHg   We will refer patient for CABG evaluation especially with diabetes and mild LV dysfunction   Aggressive medical      IMPRESSION & PLAN:   Mr. Gerhardt is a 79 y.o. with multiple medical problem      CAD:   LHC in 01/2022 showed three-vessel disease as mentioned above   CABG X 5 in 03/2022 ( LIMA to LAD, SVG to RPDA, sequential SVG to diagonal 2 and ramus intermedius and mid obtuse marginal)   Continue aspirin, Coreg and atorvastatin.  Have asked patient to take aspirin lifelong.   Currently without any angina      Atrial fibrillation:   Patient had postoperative post CABG atrial fibrillation in 03/2022   Patient was started on Coumadin and amiodarone in 03/2022.     Event monitor in 04/2022 for 30-day, sinus rhythm, no arrhythmia.  No A. fib or pauses.  Currently taking apixaban      Ischemic

## 2024-11-05 ENCOUNTER — TELEPHONE (OUTPATIENT)
Age: 79
End: 2024-11-05

## 2024-11-05 DIAGNOSIS — I25.83 CORONARY ARTERY DISEASE DUE TO LIPID RICH PLAQUE: ICD-10-CM

## 2024-11-05 DIAGNOSIS — I25.10 ATHEROSCLEROTIC HEART DISEASE OF NATIVE CORONARY ARTERY WITHOUT ANGINA PECTORIS: Primary | ICD-10-CM

## 2024-11-05 DIAGNOSIS — I25.10 CORONARY ARTERY DISEASE DUE TO LIPID RICH PLAQUE: ICD-10-CM

## 2024-11-05 RX ORDER — SPIRONOLACTONE 25 MG/1
25 TABLET ORAL DAILY
Qty: 90 TABLET | Refills: 3 | Status: SHIPPED | OUTPATIENT
Start: 2024-11-05

## 2024-11-05 NOTE — TELEPHONE ENCOUNTER
----- Message from Dr. Peter Wright MD sent at 11/5/2024  7:54 AM EST -----  Please inform patient regarding abnormal test findings.   ECHO with LOW ef 25%  Would recommend adding spironolactone  25 mg daily, keep checking BP and check BMP 3 weeks after starting.     Thank you  SP

## 2024-11-05 NOTE — TELEPHONE ENCOUNTER
I called and spoke with the pts wife, I let her know the following message.      Please inform patient regarding abnormal test findings.   ECHO with LOW ef 25%   Would recommend adding spironolactone  25 mg daily, keep checking BP and check BMP 3 weeks after starting.       They would be in [place] until 12/15 so the lab slip will be printed and emailed to the pts wife to get drawn in [place].

## 2024-11-06 ENCOUNTER — TELEPHONE (OUTPATIENT)
Age: 79
End: 2024-11-06

## 2024-11-06 NOTE — TELEPHONE ENCOUNTER
Patients wife called regarding her . Stated that they went to see PCP Dr. Sheehan and they discussed his recent echo. His EF is currently at 25%. Spironolactone was started, but Dr. Sheehan was questioning whether or not he would need an AICD. Wife wanted me to ask and get back with her.

## 2024-11-07 ENCOUNTER — TELEPHONE (OUTPATIENT)
Age: 79
End: 2024-11-07

## 2024-12-12 ENCOUNTER — HOSPITAL ENCOUNTER (OUTPATIENT)
Facility: HOSPITAL | Age: 79
Discharge: HOME | End: 2024-12-15
Payer: MEDICARE

## 2024-12-12 DIAGNOSIS — I25.83 CORONARY ARTERY DISEASE DUE TO LIPID RICH PLAQUE: ICD-10-CM

## 2024-12-12 DIAGNOSIS — I25.10 CORONARY ARTERY DISEASE DUE TO LIPID RICH PLAQUE: ICD-10-CM

## 2024-12-12 DIAGNOSIS — I25.10 ATHEROSCLEROTIC HEART DISEASE OF NATIVE CORONARY ARTERY WITHOUT ANGINA PECTORIS: ICD-10-CM

## 2024-12-12 LAB
ANION GAP SERPL CALC-SCNC: 5 MMOL/L (ref 3–18)
BUN SERPL-MCNC: 21 MG/DL (ref 7–18)
BUN/CREAT SERPL: 21 (ref 12–20)
CALCIUM SERPL-MCNC: 9.4 MG/DL (ref 8.5–10.1)
CHLORIDE SERPL-SCNC: 107 MMOL/L (ref 100–111)
CO2 SERPL-SCNC: 26 MMOL/L (ref 21–32)
CREAT SERPL-MCNC: 1.02 MG/DL (ref 0.6–1.3)
GLUCOSE SERPL-MCNC: 204 MG/DL (ref 74–99)
POTASSIUM SERPL-SCNC: 4 MMOL/L (ref 3.5–5.5)
SODIUM SERPL-SCNC: 138 MMOL/L (ref 136–145)

## 2024-12-12 PROCEDURE — 80048 BASIC METABOLIC PNL TOTAL CA: CPT

## 2024-12-12 PROCEDURE — 36415 COLL VENOUS BLD VENIPUNCTURE: CPT

## 2025-02-21 ENCOUNTER — TELEPHONE (OUTPATIENT)
Facility: HOSPITAL | Age: 80
End: 2025-02-21

## 2025-02-24 ENCOUNTER — TELEPHONE (OUTPATIENT)
Facility: HOSPITAL | Age: 80
End: 2025-02-24

## 2025-02-25 ENCOUNTER — HOSPITAL ENCOUNTER (OUTPATIENT)
Facility: HOSPITAL | Age: 80
Setting detail: RECURRING SERIES
Discharge: HOME | End: 2025-02-28
Payer: MEDICARE

## 2025-02-25 PROCEDURE — 97110 THERAPEUTIC EXERCISES: CPT

## 2025-02-25 PROCEDURE — 97112 NEUROMUSCULAR REEDUCATION: CPT

## 2025-02-25 PROCEDURE — 97140 MANUAL THERAPY 1/> REGIONS: CPT

## 2025-02-25 NOTE — PROGRESS NOTES
[facility]  [address] [facility] [address] [address] [place] [address] Ph:[phone] Fx: [phone]    PLAN OF CARE/ Statement of Necessity for Physical Therapy Services           Patient name: Payam Workman Start of Care: 2025   Referral source: Alexy Chamorro MD : 1945    Medical Diagnosis: Other low back pain [M54.59]       Onset Date: 2023   Treatment Diagnosis: M54.59  OTHER LOWER BACK PAIN                                     Prior Hospitalization: see medical history Provider#: [id number]     Comorbidities: Morbid obesity, Heart disease, Hearing impairment, HTN    Prior Level of Function: The patient reports difficulty with walking and standing that has been ongoing for over 3 years. He states he had an improved ease with this about 3 years ago, however.    The Plan of Care and following information is based on the information from the initial evaluation.    Assessment / key information:  The patient is an 79 year old male with a chief complaint of LBP that became worse about 2 years ago after suffering an ulcer. He does report that his pain will refer down his lateral hips and limits his ease of walking and ambulation efficiency. The patient has had imaging showing L4-L5 degenerative changes noting stenosis per his MD notes. The patient presents with impairments consisting of pain, decreased ROM, decreased flexibility, decreased strength, decreased core stability, decreased ease of ADLs, and limited ease of ambulation. The patient will benefit from skilled PT in order to address the aforementioned impairments.     Evaluation Complexity:  History:  HIGH Complexity :3+ comorbidities / personal factors will impact the outcome/ POC ; Examination:  MEDIUM Complexity : 3 Standardized tests and measures addressin body structure, function, activity limitation and / or participation in recreation  ;Presentation:  MEDIUM Complexity : Evolving with changing

## 2025-02-25 NOTE — PROGRESS NOTES
PHYSICAL / OCCUPATIONAL THERAPY - DAILY TREATMENT NOTE     Patient Name: Payam Workman    Date: 2025    : 1945  Insurance: Payor: MEDICARE / Plan: MEDICARE PART A AND B / Product Type: *No Product type* /      Patient  verified Yes     Visit #   Current / Total 1 24   Time   In / Out 9:05 9:42   Pain   In / Out 7/10 4-5/10   Subjective Functional Status/Changes: See IE   Changes to:  Allergies, Med Hx, Sx Hx?   no       TREATMENT AREA =  Other low back pain [M54.59]    If an interpreting service is utilized for treatment of this patient, the contents of this document represent the material reviewed with the patient via the .     OBJECTIVE  Therapeutic Procedures:  Tx Min Billable or 1:1 Min (if diff from Tx Min) Procedure, Rationale, Specifics   15  84505 Therapeutic Exercise (timed):  increase ROM, strength, coordination, balance, and proprioception to improve patient's ability to progress to PLOF and address remaining functional goals. (see flow sheet as applicable)    Details if applicable:       8  68162 Self Care/Home Management (timed):  improve patient knowledge and understanding of home injury/symptom/pain management, positioning, posture/ergonomics, home safety, activity modification, transfer techniques, and joint protection strategies  to improve patient's ability to progress to PLOF and address remaining functional goals.  (see flow sheet as applicable)    Details if applicable:  education concerning rehab goals concerning improvement in hip flexibility, and hip flexor flexibitlity, core strength and glute stability to improve ease of ambulation efficiency.    23  [facility] Totals Reminder: bill using total billable min of TIMED therapeutic procedures (example: do not include dry needle or estim unattended, both untimed codes, in totals to left)  8-22 min = 1 unit; 23-37 min = 2 units; 38-52 min = 3 units; 53-67 min = 4 units; 68-82 min = 5 units   Total Total     TOTAL TREATMENT

## 2025-02-27 ENCOUNTER — HOSPITAL ENCOUNTER (OUTPATIENT)
Facility: HOSPITAL | Age: 80
Setting detail: RECURRING SERIES
End: 2025-02-27
Payer: MEDICARE

## 2025-02-27 PROCEDURE — 97110 THERAPEUTIC EXERCISES: CPT

## 2025-02-27 PROCEDURE — 97112 NEUROMUSCULAR REEDUCATION: CPT

## 2025-02-27 NOTE — PROGRESS NOTES
PHYSICAL / OCCUPATIONAL THERAPY - DAILY TREATMENT NOTE     Patient Name: Payam Workman    Date: 2025    : 1945  Insurance: Payor: MEDICARE / Plan: MEDICARE PART A AND B / Product Type: *No Product type* /      Patient  verified Yes     Visit #   Current / Total 2 24   Time   In / Out 11:45 12:26   Pain   In / Out 4/10 4-5/10   Subjective Functional Status/Changes: Pt reports they are in more pain during the morning.   Changes to:  Allergies, Med Hx, Sx Hx?   no       TREATMENT AREA =  Other low back pain [M54.59]    If an interpreting service is utilized for treatment of this patient, the contents of this document represent the material reviewed with the patient via the .     OBJECTIVE        Therapeutic Procedures:  Tx Min Billable or 1:1 Min (if diff from Tx Min) Procedure, Rationale, Specifics   26  53274 Therapeutic Exercise (timed):  increase ROM, strength, coordination, balance, and proprioception to improve patient's ability to progress to PLOF and address remaining functional goals. (see flow sheet as applicable)    Details if applicable:       15  73950 Neuromuscular Re-Education (timed):  improve balance, coordination, kinesthetic sense, posture, core stability and proprioception to improve patient's ability to develop conscious control of individual muscles and awareness of position of extremities in order to progress to PLOF and address remaining functional goals. (see flow sheet as applicable)    Details if applicable:     41  [facility] Totals Reminder: bill using total billable min of TIMED therapeutic procedures (example: do not include dry needle or estim unattended, both untimed codes, in totals to left)  8-22 min = 1 unit; 23-37 min = 2 units; 38-52 min = 3 units; 53-67 min = 4 units; 68-82 min = 5 units   Total Total     TOTAL TREATMENT TIME:        41     [x]  Patient Education billed concurrently with other procedures   [x] Review HEP    [] Progressed/Changed HEP, detail:

## 2025-03-03 ENCOUNTER — HOSPITAL ENCOUNTER (OUTPATIENT)
Facility: HOSPITAL | Age: 80
Setting detail: RECURRING SERIES
Discharge: HOME | End: 2025-03-06
Payer: MEDICARE

## 2025-03-03 PROCEDURE — 97112 NEUROMUSCULAR REEDUCATION: CPT

## 2025-03-03 PROCEDURE — 97110 THERAPEUTIC EXERCISES: CPT

## 2025-03-03 PROCEDURE — 97530 THERAPEUTIC ACTIVITIES: CPT

## 2025-03-03 NOTE — PROGRESS NOTES
PHYSICAL / OCCUPATIONAL THERAPY - DAILY TREATMENT NOTE     Patient Name: Payam Workman    Date: 3/3/2025    : 1945  Insurance: Payor: MEDICARE / Plan: MEDICARE PART A AND B / Product Type: *No Product type* /      Patient  verified Yes     Visit #   Current / Total 3 24   Time   In / Out 1:10 1:51   Pain   In / Out 3/10 3/10   Subjective Functional Status/Changes: The patient reports that he has remained complaint with HEP and denies new pain upon arrival.   Changes to:  Allergies, Med Hx, Sx Hx?   no       TREATMENT AREA =  Other low back pain [M54.59]    If an interpreting service is utilized for treatment of this patient, the contents of this document represent the material reviewed with the patient via the .     OBJECTIVE  Therapeutic Procedures:  Tx Min Billable or 1:1 Min (if diff from Tx Min) Procedure, Rationale, Specifics   19  55295 Therapeutic Exercise (timed):  increase ROM, strength, coordination, balance, and proprioception to improve patient's ability to progress to PLOF and address remaining functional goals. (see flow sheet as applicable)    Details if applicable:       12  55578 Neuromuscular Re-Education (timed):  improve balance, coordination, kinesthetic sense, posture, core stability and proprioception to improve patient's ability to develop conscious control of individual muscles and awareness of position of extremities in order to progress to PLOF and address remaining functional goals. (see flow sheet as applicable)    Details if applicable:     10  02031 Therapeutic Activity (timed):  use of dynamic activities replicating functional movements to increase ROM, strength, coordination, balance, and proprioception in order to improve patient's ability to progress to PLOF and address remaining functional goals.  (see flow sheet as applicable)     Details if applicable:     41  [facility] Totals Reminder: bill using total billable min of TIMED therapeutic procedures (example: do

## 2025-03-05 ENCOUNTER — HOSPITAL ENCOUNTER (OUTPATIENT)
Facility: HOSPITAL | Age: 80
Setting detail: RECURRING SERIES
Discharge: HOME | End: 2025-03-08
Payer: MEDICARE

## 2025-03-05 PROCEDURE — 97110 THERAPEUTIC EXERCISES: CPT

## 2025-03-05 PROCEDURE — 97112 NEUROMUSCULAR REEDUCATION: CPT

## 2025-03-05 PROCEDURE — 97530 THERAPEUTIC ACTIVITIES: CPT

## 2025-03-05 NOTE — PROGRESS NOTES
B  The patient will demonstrate lumbar rotation to 50 degrees B measured in seated to improve ease of car transfers.              IE: 35 degrees right, 38 degrees left  The patient will report 50% improvement in order to improve quality of life.               IE: 0%    Current; No changes reported to date. 3/05/2025    PLAN  Yes  Continue plan of care  []  Upgrade activities as tolerated  []  Discharge due to :  []  Other:    Laith Bailey, PT    3/5/2025    12:26 PM    Future Appointments   Date Time Provider Department Center   3/10/2025  1:10 PM Narda Blackwell, JARR[facility]   3/12/2025  1:10 PM Marija Tom, PT [facility]   3/14/2025  9:00 AM Claire Paredes PA-C [facility] BS AMB   3/17/2025  1:10 PM Narda Blackwell PTA [facility]

## 2025-03-06 ENCOUNTER — TRANSCRIBE ORDERS (OUTPATIENT)
Facility: HOSPITAL | Age: 80
End: 2025-03-06

## 2025-03-06 ENCOUNTER — HOSPITAL ENCOUNTER (OUTPATIENT)
Facility: HOSPITAL | Age: 80
Discharge: HOME | End: 2025-03-09
Payer: MEDICARE

## 2025-03-06 DIAGNOSIS — E11.9 DM TYPE 2, NOT AT GOAL (HCC): ICD-10-CM

## 2025-03-06 DIAGNOSIS — E78.5 DYSLIPIDEMIA: ICD-10-CM

## 2025-03-06 DIAGNOSIS — D75.1 ERYTHROCYTOSIS: ICD-10-CM

## 2025-03-06 DIAGNOSIS — I10 HYPERTENSION, UNSPECIFIED TYPE: ICD-10-CM

## 2025-03-06 DIAGNOSIS — N18.2 CKD (CHRONIC KIDNEY DISEASE) STAGE 2, GFR 60-89 ML/MIN: ICD-10-CM

## 2025-03-06 DIAGNOSIS — D75.1 ERYTHROCYTOSIS: Primary | ICD-10-CM

## 2025-03-06 LAB
25(OH)D3 SERPL-MCNC: 61 NG/ML (ref 30–100)
ALBUMIN SERPL-MCNC: 4.1 G/DL (ref 3.4–5)
ALBUMIN/GLOB SERPL: 1.5 (ref 0.8–1.7)
ALP SERPL-CCNC: 96 U/L (ref 45–117)
ALT SERPL-CCNC: 20 U/L (ref 16–61)
ANION GAP SERPL CALC-SCNC: 6 MMOL/L (ref 3–18)
APPEARANCE UR: CLEAR
AST SERPL-CCNC: 13 U/L (ref 10–38)
BACTERIA URNS QL MICRO: NEGATIVE /HPF
BILIRUB SERPL-MCNC: 1.1 MG/DL (ref 0.2–1)
BILIRUB UR QL: NEGATIVE
BUN SERPL-MCNC: 18 MG/DL (ref 7–18)
BUN/CREAT SERPL: 21 (ref 12–20)
CALCIUM SERPL-MCNC: 9.6 MG/DL (ref 8.5–10.1)
CHLORIDE SERPL-SCNC: 106 MMOL/L (ref 100–111)
CHOLEST SERPL-MCNC: 138 MG/DL
CO2 SERPL-SCNC: 26 MMOL/L (ref 21–32)
COLOR UR: ABNORMAL
CREAT SERPL-MCNC: 0.85 MG/DL (ref 0.6–1.3)
CREAT UR-MCNC: 71 MG/DL (ref 30–125)
EPITH CASTS URNS QL MICRO: ABNORMAL /LPF (ref 0–5)
ERYTHROCYTE [DISTWIDTH] IN BLOOD BY AUTOMATED COUNT: 12.4 % (ref 11.6–14.5)
EST. AVERAGE GLUCOSE BLD GHB EST-MCNC: 131 MG/DL
FOLATE SERPL-MCNC: >20 NG/ML (ref 3.1–17.5)
GLOBULIN SER CALC-MCNC: 2.8 G/DL (ref 2–4)
GLUCOSE SERPL-MCNC: 102 MG/DL (ref 74–99)
GLUCOSE UR STRIP.AUTO-MCNC: >1000 MG/DL
HBA1C MFR BLD: 6.2 % (ref 4.2–5.6)
HCT VFR BLD AUTO: 49.6 % (ref 36–48)
HDLC SERPL-MCNC: 54 MG/DL (ref 40–60)
HDLC SERPL: 2.6 (ref 0–5)
HGB BLD-MCNC: 16.2 G/DL (ref 13–16)
HGB UR QL STRIP: NEGATIVE
KETONES UR QL STRIP.AUTO: NEGATIVE MG/DL
LDLC SERPL CALC-MCNC: 49.6 MG/DL (ref 0–100)
LEUKOCYTE ESTERASE UR QL STRIP.AUTO: NEGATIVE
LIPID PANEL: ABNORMAL
MAGNESIUM SERPL-MCNC: 1.8 MG/DL (ref 1.6–2.6)
MCH RBC QN AUTO: 33.4 PG (ref 24–34)
MCHC RBC AUTO-ENTMCNC: 32.7 G/DL (ref 31–37)
MCV RBC AUTO: 102.3 FL (ref 78–100)
NITRITE UR QL STRIP.AUTO: NEGATIVE
NRBC # BLD: 0 K/UL (ref 0–0.01)
NRBC BLD-RTO: 0 PER 100 WBC
PH UR STRIP: 5.5 (ref 5–8)
PHOSPHATE SERPL-MCNC: 3.5 MG/DL (ref 2.5–4.9)
PLATELET # BLD AUTO: 224 K/UL (ref 135–420)
PMV BLD AUTO: 11.8 FL (ref 9.2–11.8)
POTASSIUM SERPL-SCNC: 4.2 MMOL/L (ref 3.5–5.5)
PROT SERPL-MCNC: 6.9 G/DL (ref 6.4–8.2)
PROT UR STRIP-MCNC: NEGATIVE MG/DL
PROT UR-MCNC: 24 MG/DL
PROT/CREAT UR-RTO: 0.3
RBC # BLD AUTO: 4.85 M/UL (ref 4.35–5.65)
RBC #/AREA URNS HPF: ABNORMAL /HPF (ref 0–5)
SODIUM SERPL-SCNC: 138 MMOL/L (ref 136–145)
SP GR UR REFRACTOMETRY: 1.03 (ref 1–1.03)
TRIGL SERPL-MCNC: 172 MG/DL
URATE SERPL-MCNC: 3.7 MG/DL (ref 2.6–7.2)
UROBILINOGEN UR QL STRIP.AUTO: 1 EU/DL (ref 0.2–1)
VIT B12 SERPL-MCNC: 728 PG/ML (ref 211–911)
VLDLC SERPL CALC-MCNC: 34.4 MG/DL
WBC # BLD AUTO: 7 K/UL (ref 4.6–13.2)
WBC URNS QL MICRO: ABNORMAL /HPF (ref 0–5)

## 2025-03-06 PROCEDURE — 82306 VITAMIN D 25 HYDROXY: CPT

## 2025-03-06 PROCEDURE — 82607 VITAMIN B-12: CPT

## 2025-03-06 PROCEDURE — 84550 ASSAY OF BLOOD/URIC ACID: CPT

## 2025-03-06 PROCEDURE — 80061 LIPID PANEL: CPT

## 2025-03-06 PROCEDURE — 82746 ASSAY OF FOLIC ACID SERUM: CPT

## 2025-03-06 PROCEDURE — 80053 COMPREHEN METABOLIC PANEL: CPT

## 2025-03-06 PROCEDURE — 83735 ASSAY OF MAGNESIUM: CPT

## 2025-03-06 PROCEDURE — 84156 ASSAY OF PROTEIN URINE: CPT

## 2025-03-06 PROCEDURE — 84100 ASSAY OF PHOSPHORUS: CPT

## 2025-03-06 PROCEDURE — 83036 HEMOGLOBIN GLYCOSYLATED A1C: CPT

## 2025-03-06 PROCEDURE — 81001 URINALYSIS AUTO W/SCOPE: CPT

## 2025-03-06 PROCEDURE — 85027 COMPLETE CBC AUTOMATED: CPT

## 2025-03-06 PROCEDURE — 36415 COLL VENOUS BLD VENIPUNCTURE: CPT

## 2025-03-06 PROCEDURE — 82570 ASSAY OF URINE CREATININE: CPT

## 2025-03-10 ENCOUNTER — HOSPITAL ENCOUNTER (OUTPATIENT)
Facility: HOSPITAL | Age: 80
Setting detail: RECURRING SERIES
Discharge: HOME | End: 2025-03-13
Payer: MEDICARE

## 2025-03-10 PROCEDURE — 97530 THERAPEUTIC ACTIVITIES: CPT

## 2025-03-10 PROCEDURE — 97110 THERAPEUTIC EXERCISES: CPT

## 2025-03-10 PROCEDURE — 97112 NEUROMUSCULAR REEDUCATION: CPT

## 2025-03-10 NOTE — PROGRESS NOTES
PHYSICAL / OCCUPATIONAL THERAPY - DAILY TREATMENT NOTE     Patient Name: Payam Workman    Date: 3/10/2025    : 1945  Insurance: Payor: MEDICARE / Plan: MEDICARE PART A AND B / Product Type: *No Product type* /      Patient  verified Yes     Visit #   Current / Total 5 24   Time   In / Out 1:10 1:50   Pain   In / Out 3/10 3/10   Subjective Functional Status/Changes: Pt reports compliance with HEP.  Pt states he has no increased symptoms.    Changes to:  Allergies, Med Hx, Sx Hx?   no       TREATMENT AREA =  Other low back pain [M54.59]    If an interpreting service is utilized for treatment of this patient, the contents of this document represent the material reviewed with the patient via the .     OBJECTIVE    Therapeutic Procedures:  Tx Min Billable or 1:1 Min (if diff from Tx Min) Procedure, Rationale, Specifics   20  95335 Therapeutic Exercise (timed):  increase ROM, strength, coordination, balance, and proprioception to improve patient's ability to progress to PLOF and address remaining functional goals. (see flow sheet as applicable)    Details if applicable:       10  29587 Neuromuscular Re-Education (timed):  improve balance, coordination, kinesthetic sense, posture, core stability and proprioception to improve patient's ability to develop conscious control of individual muscles and awareness of position of extremities in order to progress to PLOF and address remaining functional goals. (see flow sheet as applicable)    Details if applicable:     10  22019 Therapeutic Activity (timed):  use of dynamic activities replicating functional movements to increase ROM, strength, coordination, balance, and proprioception in order to improve patient's ability to progress to PLOF and address remaining functional goals.  (see flow sheet as applicable)     Details if applicable:     40  [facility] Totals Reminder: bill using total billable min of TIMED therapeutic procedures (example: do not include dry

## 2025-03-12 ENCOUNTER — HOSPITAL ENCOUNTER (OUTPATIENT)
Facility: HOSPITAL | Age: 80
Setting detail: RECURRING SERIES
Discharge: HOME | End: 2025-03-15
Payer: MEDICARE

## 2025-03-12 PROCEDURE — 97110 THERAPEUTIC EXERCISES: CPT

## 2025-03-12 PROCEDURE — 97530 THERAPEUTIC ACTIVITIES: CPT

## 2025-03-12 NOTE — PROGRESS NOTES
PHYSICAL / OCCUPATIONAL THERAPY - DAILY TREATMENT NOTE     Patient Name: Payam Workman    Date: 3/12/2025    : 1945  Insurance: Payor: MEDICARE / Plan: MEDICARE PART A AND B / Product Type: *No Product type* /      Patient  verified Yes     Visit #   Current / Total 6 24    Time   In / Out 01:13p 01:51p   Pain   In / Out 2 0   Subjective Functional Status/Changes: Pt reports \"I feel like I've been getting a little better, that I can do more.\"  States he's noticed mild improvement in the pain however it is still present (worse in morning) in lower back and back of thighs.   Changes to:  Allergies, Med Hx, Sx Hx?   no       TREATMENT AREA =  Other low back pain [M54.59]    If an interpreting service is utilized for treatment of this patient, the contents of this document represent the material reviewed with the patient via the .     OBJECTIVE    Therapeutic Procedures:  Tx Min Billable or 1:1 Min (if diff from Tx Min) Procedure, Rationale, Specifics   26  05699 Therapeutic Exercise (timed):  increase ROM, strength, coordination, balance, and proprioception to improve patient's ability to progress to PLOF and address remaining functional goals. (see flow sheet as applicable)    Details if applicable:       12  96768 Therapeutic Activity (timed):  use of dynamic activities replicating functional movements to increase ROM, strength, coordination, balance, and proprioception in order to improve patient's ability to progress to PLOF and address remaining functional goals.  (see flow sheet as applicable)     Details if applicable:     38  [facility] Totals Reminder: bill using total billable min of TIMED therapeutic procedures (example: do not include dry needle or estim unattended, both untimed codes, in totals to left)  8-22 min = 1 unit; 23-37 min = 2 units; 38-52 min = 3 units; 53-67 min = 4 units; 68-82 min = 5 units   Total Total     TOTAL TREATMENT TIME:        38     [x]  Patient Education billed

## 2025-03-14 ENCOUNTER — OFFICE VISIT (OUTPATIENT)
Age: 80
End: 2025-03-14
Payer: MEDICARE

## 2025-03-14 VITALS
WEIGHT: 200 LBS | SYSTOLIC BLOOD PRESSURE: 154 MMHG | DIASTOLIC BLOOD PRESSURE: 70 MMHG | BODY MASS INDEX: 33.32 KG/M2 | HEART RATE: 78 BPM | HEIGHT: 65 IN | OXYGEN SATURATION: 96 %

## 2025-03-14 DIAGNOSIS — I25.5 ISCHEMIC CARDIOMYOPATHY: Primary | ICD-10-CM

## 2025-03-14 DIAGNOSIS — I10 ESSENTIAL HYPERTENSION WITH GOAL BLOOD PRESSURE LESS THAN 140/90: ICD-10-CM

## 2025-03-14 DIAGNOSIS — I48.0 PAF (PAROXYSMAL ATRIAL FIBRILLATION) (HCC): ICD-10-CM

## 2025-03-14 DIAGNOSIS — Z95.1 S/P CABG X 5: ICD-10-CM

## 2025-03-14 PROCEDURE — G8417 CALC BMI ABV UP PARAM F/U: HCPCS | Performed by: PHYSICIAN ASSISTANT

## 2025-03-14 PROCEDURE — 3077F SYST BP >= 140 MM HG: CPT | Performed by: PHYSICIAN ASSISTANT

## 2025-03-14 PROCEDURE — G8428 CUR MEDS NOT DOCUMENT: HCPCS | Performed by: PHYSICIAN ASSISTANT

## 2025-03-14 PROCEDURE — 1126F AMNT PAIN NOTED NONE PRSNT: CPT | Performed by: PHYSICIAN ASSISTANT

## 2025-03-14 PROCEDURE — 99214 OFFICE O/P EST MOD 30 MIN: CPT | Performed by: PHYSICIAN ASSISTANT

## 2025-03-14 PROCEDURE — 1036F TOBACCO NON-USER: CPT | Performed by: PHYSICIAN ASSISTANT

## 2025-03-14 PROCEDURE — 1123F ACP DISCUSS/DSCN MKR DOCD: CPT | Performed by: PHYSICIAN ASSISTANT

## 2025-03-14 PROCEDURE — 3078F DIAST BP <80 MM HG: CPT | Performed by: PHYSICIAN ASSISTANT

## 2025-03-14 RX ORDER — CARVEDILOL 6.25 MG/1
6.25 TABLET ORAL 2 TIMES DAILY
COMMUNITY
Start: 2025-01-25

## 2025-03-14 RX ORDER — DONEPEZIL HYDROCHLORIDE 5 MG/1
TABLET, FILM COATED ORAL
COMMUNITY

## 2025-03-14 RX ORDER — GABAPENTIN 300 MG/1
300 CAPSULE ORAL 3 TIMES DAILY
COMMUNITY
Start: 2025-01-13

## 2025-03-14 NOTE — PROGRESS NOTES
[facility]    Payam Workman is a 79 y.o. male    Patient is here today for follow-up appointment for ischemic cardiomyopathy, A-fib  Known history of CAD s/p [facility] in 01/2022 showed three-vessel disease as mentioned above   CABG X 5 in 03/2022 ( LIMA to LAD, SVG to RPDA, sequential SVG to diagonal 2 and ramus intermedius and mid obtuse marginal)  Also has atrial fibrillation which developed post CABG in 2022     Patient was admitted to the hospital in [place] in 05/2024 after having syncopal episode and was found to have a significant anemia.  EGD showed nonbleeding ulcer.  Repeat EGD a month later showed healed ulcer.  Patient was started on PPI.     Presents to the office today for follow up. He is leaving for [place] in May. He states he is doing really well from a cardiac standpoint. He feels better than he has in a long time. He is active and denies any s/s concerning for angina or decompensated heart failure. Denies CP, SOB, diaphoresis, N/V/D, weight gain, LE edema, orthopnea, PND, palpitations, near syncope or syncope, dizziness, melena, BRBPR.     Past Medical History:   Diagnosis Date    Diabetes (HCC)     HLD (hyperlipidemia)     Hypertension     PUD (peptic ulcer disease)     S/P CABG x 5 03/2022    LIMA to LAD, SVG to RPDA, sequential SVG to diagonal 2 and ramus intermedius and mid obtuse marginal    Thyroid disease        Past Surgical History:   Procedure Laterality Date    COLONOSCOPY N/A 5/3/2016    COLONOSCOPY with hot snare polypectomy and cold forceps polypectomy performed by Alexandru Arce MD at [facility] ENDOSCOPY    COLONOSCOPY N/A 3/18/2019    COLONOSCOPY performed by Rajesh Qureshi MD at [facility] ENDOSCOPY    GI      hernia repair and colonoscopy       Current Outpatient Medications   Medication Sig    spironolactone (ALDACTONE) 25 MG tablet Take 1 tablet by mouth daily    apixaban (ELIQUIS) 5 MG TABS tablet Take 1 tablet by mouth nightly    FARXIGA 10 MG tablet Take 1 tablet by mouth daily

## 2025-03-17 ENCOUNTER — HOSPITAL ENCOUNTER (OUTPATIENT)
Facility: HOSPITAL | Age: 80
Setting detail: RECURRING SERIES
Discharge: HOME | End: 2025-03-20
Payer: MEDICARE

## 2025-03-17 PROCEDURE — 97530 THERAPEUTIC ACTIVITIES: CPT

## 2025-03-17 PROCEDURE — 97110 THERAPEUTIC EXERCISES: CPT

## 2025-03-17 PROCEDURE — 97112 NEUROMUSCULAR REEDUCATION: CPT

## 2025-03-17 NOTE — PROGRESS NOTES
PHYSICAL / OCCUPATIONAL THERAPY - DAILY TREATMENT NOTE     Patient Name: Payam Workman    Date: 3/17/2025    : 1945  Insurance: Payor: MEDICARE / Plan: MEDICARE PART A AND B / Product Type: *No Product type* /      Patient  verified Yes     Visit #   Current / Total 7 24   Time   In / Out 1:10 1:50   Pain   In / Out 4/10 4/10   Subjective Functional Status/Changes: Pt reports no new complaints of pain.  Pt states he had a fall yesterday off of a 2 ft step ladder backwards and to is right side in which he landed on a hamper but denies any injury.    Changes to:  Allergies, Med Hx, Sx Hx?   no       TREATMENT AREA =  Other low back pain [M54.59]    If an interpreting service is utilized for treatment of this patient, the contents of this document represent the material reviewed with the patient via the .     OBJECTIVE  Therapeutic Procedures:  Tx Min Billable or 1:1 Min (if diff from Tx Min) Procedure, Rationale, Specifics   20  92146 Therapeutic Exercise (timed):  increase ROM, strength, coordination, balance, and proprioception to improve patient's ability to progress to PLOF and address remaining functional goals. (see flow sheet as applicable)    Details if applicable:       10  16027 Neuromuscular Re-Education (timed):  improve balance, coordination, kinesthetic sense, posture, core stability and proprioception to improve patient's ability to develop conscious control of individual muscles and awareness of position of extremities in order to progress to PLOF and address remaining functional goals. (see flow sheet as applicable)    Details if applicable:     10  06601 Therapeutic Activity (timed):  use of dynamic activities replicating functional movements to increase ROM, strength, coordination, balance, and proprioception in order to improve patient's ability to progress to PLOF and address remaining functional goals.  (see flow sheet as applicable)     Details if applicable:     40  [facility]

## 2025-03-24 ENCOUNTER — HOSPITAL ENCOUNTER (OUTPATIENT)
Facility: HOSPITAL | Age: 80
Setting detail: RECURRING SERIES
Discharge: HOME | End: 2025-03-27
Payer: MEDICARE

## 2025-03-24 PROCEDURE — 97112 NEUROMUSCULAR REEDUCATION: CPT

## 2025-03-24 PROCEDURE — 97110 THERAPEUTIC EXERCISES: CPT

## 2025-03-24 PROCEDURE — 97530 THERAPEUTIC ACTIVITIES: CPT

## 2025-03-24 NOTE — PROGRESS NOTES
PHYSICAL / OCCUPATIONAL THERAPY - DAILY TREATMENT NOTE     Patient Name: Payam Workman    Date: 3/24/2025    : 1945  Insurance: Payor: MEDICARE / Plan: MEDICARE PART A AND B / Product Type: *No Product type* /      Patient  verified Yes     Visit #   Current / Total 8 24   Time   In / Out 1:50 2:30   Pain   In / Out 4/10 2.5/10   Subjective Functional Status/Changes: Pt reports today his pain is a 4/10 but sometimes it goes down to a 3/10.  Pt states he has to DC from PT due to moving back to [place] for the summer.    Changes to:  Allergies, Med Hx, Sx Hx?   no       TREATMENT AREA =  Other low back pain [M54.59]    If an interpreting service is utilized for treatment of this patient, the contents of this document represent the material reviewed with the patient via the .     OBJECTIVE    Therapeutic Procedures:  Tx Min Billable or 1:1 Min (if diff from Tx Min) Procedure, Rationale, Specifics   20  02635 Therapeutic Exercise (timed):  increase ROM, strength, coordination, balance, and proprioception to improve patient's ability to progress to PLOF and address remaining functional goals. (see flow sheet as applicable)    Details if applicable:       10  20982 Neuromuscular Re-Education (timed):  improve balance, coordination, kinesthetic sense, posture, core stability and proprioception to improve patient's ability to develop conscious control of individual muscles and awareness of position of extremities in order to progress to PLOF and address remaining functional goals. (see flow sheet as applicable)    Details if applicable:     10  78872 Therapeutic Activity (timed):  use of dynamic activities replicating functional movements to increase ROM, strength, coordination, balance, and proprioception in order to improve patient's ability to progress to PLOF and address remaining functional goals.  (see flow sheet as applicable)     Details if applicable:     40  [facility] Totals Reminder: bill using

## 2025-03-25 NOTE — PROGRESS NOTES
[facility]  [address]  [place] [address] [phone] fax    Physical Therapy Discharge Summary  Patient name: Payam Workman Start of Care: 25   Referral source: Alexy Chamorro MD : 1945   Medical/Treatment Diagnosis: Other low back pain [M54.59]  Payor: MEDICARE / Plan: MEDICARE PART A AND B / Product Type: *No Product type* /  Onset Date:23     Prior Hospitalization: see medical history Provider#: [id number]   Medications: Verified on Patient Summary List    Comorbidities: Morbid obesity, Heart disease, Hearing impairment, HTN     Prior Level of Function: The patient reports difficulty with walking and standing that has been ongoing for over 3 years. He states he had an improved ease with this about 3 years ago, however.  Visits from Start of Care: 8    Missed Visits: 0    Reporting Period : 25 to 3/24/25    Goals/Measure of Progress:  Short Term Goals: To be accomplished in 2 weeks  The patient will demonstrate independence and compliance with HEP to maximize therapeutic benefit.              IE: issued HEP              Current: Pt reports compliance. 3/12/25  The patient will improve HS flexibility to < 40 degrees to improve ease of ADLs.              IE: 65 degrees left, 58 degrees right              Current 3/03/2025: Progressing Left HS: 58 degrees; Right 50 degrees  Long Term Goals: To be accomplished in 12 weeks  The patient will improve Oswestry score to < 20% in order to improve quality of life.              IE: 42%              Current: Progressed to 32%. 2025  The patient will demonstrate hip flexion and ABD strength to 4+/5 MMT to maximize stability in stance.              IE: 4-/5 MMT right 3+/5 MMT ABD, hip flexor 4-/5 MMT B              Current: Hip Abd MMT: Right 4-/5; left 4/5; Hip flexor 4/5. 2025  The patient will demonstrate lumbar rotation to 50 degrees B measured in seated to improve ease

## 2025-09-06 ENCOUNTER — TRANSCRIBE ORDERS (OUTPATIENT)
Facility: HOSPITAL | Age: 80
End: 2025-09-06

## 2025-09-06 DIAGNOSIS — M54.9 BACK PAIN, UNSPECIFIED BACK LOCATION, UNSPECIFIED BACK PAIN LATERALITY, UNSPECIFIED CHRONICITY: Primary | ICD-10-CM

## (undated) DEVICE — RADIFOCUS OPTITORQUE ANGIOGRAPHIC CATHETER: Brand: OPTITORQUE

## (undated) DEVICE — CATH IV SAFE STR 22GX1IN BLU -- PROTECTIV PLUS

## (undated) DEVICE — AIRLIFE™ NASAL OXYGEN CANNULA CURVED, NONFLARED TIP WITH 14 FOOT (4.3 M) CRUSH-RESISTANT TUBING, OVER-THE-EAR STYLE: Brand: AIRLIFE™

## (undated) DEVICE — FLEX ADVANTAGE 1500CC: Brand: FLEX ADVANTAGE

## (undated) DEVICE — GLIDESHEATH SLENDER STAINLESS STEEL KIT: Brand: GLIDESHEATH SLENDER

## (undated) DEVICE — PRESSURE MONITORING SET: Brand: TRUWAVE

## (undated) DEVICE — FLUFF AND POLYMER UNDERPAD,EXTRA HEAVY: Brand: WINGS

## (undated) DEVICE — SET ANGIO L6.5IN L BOR 3 W STPCOCK SPIK TBNG

## (undated) DEVICE — BAND RADIAL COMPR ARTERY 24CM -- REG BX/10

## (undated) DEVICE — SURGICAL PROCEDURE KIT LT HRT CUST

## (undated) DEVICE — PROCEDURE KIT FLUID MGMT 10 FR CUST MAINFOLD

## (undated) DEVICE — MEDI-VAC NON-CONDUCTIVE SUCTION TUBING: Brand: CARDINAL HEALTH

## (undated) DEVICE — GUIDEWIRE VASC L260CM DIA0.035IN RAD 3MM J TIP L7CM PTFE

## (undated) DEVICE — Device